# Patient Record
Sex: FEMALE | Race: BLACK OR AFRICAN AMERICAN | NOT HISPANIC OR LATINO | Employment: FULL TIME | URBAN - METROPOLITAN AREA
[De-identification: names, ages, dates, MRNs, and addresses within clinical notes are randomized per-mention and may not be internally consistent; named-entity substitution may affect disease eponyms.]

---

## 2021-09-30 ENCOUNTER — APPOINTMENT (OUTPATIENT)
Dept: URGENT CARE | Facility: MEDICAL CENTER | Age: 51
End: 2021-09-30
Payer: OTHER MISCELLANEOUS

## 2021-09-30 PROCEDURE — 99284 EMERGENCY DEPT VISIT MOD MDM: CPT | Performed by: PHYSICIAN ASSISTANT

## 2021-09-30 PROCEDURE — G0383 LEV 4 HOSP TYPE B ED VISIT: HCPCS | Performed by: PHYSICIAN ASSISTANT

## 2021-10-07 ENCOUNTER — APPOINTMENT (OUTPATIENT)
Dept: URGENT CARE | Facility: MEDICAL CENTER | Age: 51
End: 2021-10-07
Payer: OTHER MISCELLANEOUS

## 2021-10-07 PROCEDURE — 99213 OFFICE O/P EST LOW 20 MIN: CPT | Performed by: PHYSICIAN ASSISTANT

## 2021-10-18 ENCOUNTER — APPOINTMENT (OUTPATIENT)
Dept: URGENT CARE | Age: 51
End: 2021-10-18
Payer: OTHER MISCELLANEOUS

## 2021-10-18 PROCEDURE — 99214 OFFICE O/P EST MOD 30 MIN: CPT | Performed by: PREVENTIVE MEDICINE

## 2021-11-02 ENCOUNTER — APPOINTMENT (OUTPATIENT)
Dept: URGENT CARE | Age: 51
End: 2021-11-02
Payer: OTHER MISCELLANEOUS

## 2021-11-02 PROCEDURE — 99213 OFFICE O/P EST LOW 20 MIN: CPT | Performed by: PREVENTIVE MEDICINE

## 2022-01-10 ENCOUNTER — APPOINTMENT (OUTPATIENT)
Dept: URGENT CARE | Age: 52
End: 2022-01-10
Payer: OTHER MISCELLANEOUS

## 2022-01-10 PROCEDURE — 99213 OFFICE O/P EST LOW 20 MIN: CPT | Performed by: PREVENTIVE MEDICINE

## 2022-01-31 ENCOUNTER — APPOINTMENT (OUTPATIENT)
Dept: URGENT CARE | Age: 52
End: 2022-01-31
Payer: OTHER MISCELLANEOUS

## 2022-01-31 PROCEDURE — 99213 OFFICE O/P EST LOW 20 MIN: CPT | Performed by: PREVENTIVE MEDICINE

## 2022-02-24 ENCOUNTER — APPOINTMENT (OUTPATIENT)
Dept: URGENT CARE | Age: 52
End: 2022-02-24
Payer: OTHER MISCELLANEOUS

## 2022-02-24 PROCEDURE — 99213 OFFICE O/P EST LOW 20 MIN: CPT | Performed by: PREVENTIVE MEDICINE

## 2022-03-02 ENCOUNTER — OFFICE VISIT (OUTPATIENT)
Dept: INTERNAL MEDICINE CLINIC | Facility: CLINIC | Age: 52
End: 2022-03-02
Payer: COMMERCIAL

## 2022-03-02 VITALS
SYSTOLIC BLOOD PRESSURE: 130 MMHG | HEART RATE: 85 BPM | OXYGEN SATURATION: 97 % | TEMPERATURE: 98.7 F | DIASTOLIC BLOOD PRESSURE: 80 MMHG | HEIGHT: 65 IN

## 2022-03-02 DIAGNOSIS — Z12.31 BREAST CANCER SCREENING BY MAMMOGRAM: ICD-10-CM

## 2022-03-02 DIAGNOSIS — S89.91XD KNEE INJURY, RIGHT, SUBSEQUENT ENCOUNTER: ICD-10-CM

## 2022-03-02 DIAGNOSIS — R03.0 PRE-HYPERTENSION: ICD-10-CM

## 2022-03-02 DIAGNOSIS — Z13.1 SCREENING FOR DIABETES MELLITUS: ICD-10-CM

## 2022-03-02 DIAGNOSIS — Z13.89 SCREENING FOR BLOOD OR PROTEIN IN URINE: ICD-10-CM

## 2022-03-02 DIAGNOSIS — Z12.11 ENCOUNTER FOR COLORECTAL CANCER SCREENING: Primary | ICD-10-CM

## 2022-03-02 DIAGNOSIS — Z78.0 MENOPAUSE: ICD-10-CM

## 2022-03-02 DIAGNOSIS — Z13.228 SCREENING FOR METABOLIC DISORDER: ICD-10-CM

## 2022-03-02 DIAGNOSIS — Z12.12 ENCOUNTER FOR COLORECTAL CANCER SCREENING: Primary | ICD-10-CM

## 2022-03-02 DIAGNOSIS — Z13.6 ENCOUNTER FOR LIPID SCREENING FOR CARDIOVASCULAR DISEASE: ICD-10-CM

## 2022-03-02 DIAGNOSIS — Z13.220 ENCOUNTER FOR LIPID SCREENING FOR CARDIOVASCULAR DISEASE: ICD-10-CM

## 2022-03-02 DIAGNOSIS — D50.0 IRON DEFICIENCY ANEMIA DUE TO CHRONIC BLOOD LOSS: ICD-10-CM

## 2022-03-02 DIAGNOSIS — M87.9 OSTEONECROSIS (HCC): ICD-10-CM

## 2022-03-02 DIAGNOSIS — E55.9 VITAMIN D DEFICIENCY: ICD-10-CM

## 2022-03-02 PROCEDURE — 3725F SCREEN DEPRESSION PERFORMED: CPT | Performed by: INTERNAL MEDICINE

## 2022-03-02 PROCEDURE — 99204 OFFICE O/P NEW MOD 45 MIN: CPT | Performed by: INTERNAL MEDICINE

## 2022-03-02 NOTE — PROGRESS NOTES
Assessment/Plan:    I spent 45 minutes directly with the patient during this visit  today in which greater than 50% of this time was spent in counseling/coordination of care regarding Diagnostic results, Prognosis, Risks and benefits of tx options, Intructions for management, Patient and family education, Importance of tx compliance, Risk factor reductions and Impressions  All the HCM needs were addressed  Pt was give screening scripts and advised follow up after completion  Borderline HTN : Recommend Echo for evaluation of LV  Additionally,   Pt would like consideration to not need to do certain maneuvers at work which would need for her to intervene physically due to damage to her right knee due one such incident  She also has hx of DJD in both knees and a previous hx of damage to her right ankle   I do not have the details of her visit with workmen's comp           Diagnoses and all orders for this visit:    Encounter for colorectal cancer screening  -     Mammo screening bilateral w 3d & cad; Future  -     Cologuard    Screening for diabetes mellitus  -     Hemoglobin A1C; Future    Iron deficiency anemia due to chronic blood loss  -     CBC and differential; Future    Breast cancer screening by mammogram  -     Mammo screening bilateral w 3d & cad; Future    Encounter for lipid screening for cardiovascular disease  -     Lipid Panel with Direct LDL reflex; Future  -     UA w Reflex to Microscopic w Reflex to Culture    Screening for blood or protein in urine    Screening for metabolic disorder  -     Comprehensive metabolic panel; Future    Menopause  -     Follicle stimulating hormone; Future    Vitamin D deficiency  -     Vitamin D 25 hydroxy; Future    Osteonecrosis (HCC)  -     Cancel: XR ankle 3+ vw right; Future    Pre-hypertension  -     Echo complete w/ contrast if indicated; Future    Knee injury, right, subsequent encounter  -     Ambulatory Referral to Orthopedic Surgery;  Future Subjective:   Chief Complaint   Patient presents with    New Patient Visit     [address] Screening     dep screen mammogram ordered pt would not like to have colonoscopy done      Patient ID: Carmina Siddiqi is a 45 y o  female  Pt would like consideration to not need to do certain maneuvers at work which would need for her to intervene physically due to damage to her right knee due one such incident  She also has hx of DJD in both knees and a previous hx of damage to her right ankle     Knee Pain   Injury mechanism: Happened about September 2021, at a time when she was being trained for restrain training when her knee slipped out on her resulting in damage to her middle min meniscus  Treated conservatively without intervention  Was treated with physical therapy  The pain is present in the right knee  The quality of the pain is described as aching  The pain is moderate  The pain has been improving since onset  The symptoms are aggravated by movement  Treatments tried: Was treated conservatively with physical therapy and over time has progressively improved  The treatment provided moderate relief  Arthritis  Presents for follow-up visit  She complains of pain, stiffness and joint swelling  Affected locations include the left knee, right knee and right ankle  Pain scale currently: Three years back had osteonecrosis of distal head of fibula for unknown etiology  Treated conservatively  The following portions of the patient's history were reviewed and updated as appropriate: past family history, past medical history, past social history, past surgical history and problem listecho    Review of Systems   Constitutional: Positive for activity change  HENT: Positive for postnasal drip  Musculoskeletal: Positive for arthritis, joint swelling and stiffness  Allergic/Immunologic: Positive for environmental allergies and food allergies           Past Medical History: Diagnosis Date    Anemia     Arthritis        No current outpatient medications on file  Allergies   Allergen Reactions    Peanut (Diagnostic) - Food Allergy Hives, Shortness Of Breath and Throat Swelling    Cat Hair Extract Allergic Rhinitis    Shrimp (Diagnostic) - Food Allergy Swelling       Social History   History reviewed  No pertinent surgical history  Family History   Problem Relation Age of Onset    Diabetes Mother     Hypertension Mother    [facility] Rheum arthritis Mother     No Known Problems Father     Anemia Sister        Objective:  /80 (BP Location: Left arm, Patient Position: Sitting, Cuff Size: Large)   Pulse 85   Temp 98 7 °F (37 1 °C) (Tympanic)   Ht 5' 4 96" (1 65 m)   SpO2 97% Comment: room air    No results found for this or any previous visit (from the past 1344 hour(s))  Physical Exam      Gen: NAD  Heent: atraumatic, normocephalic  Mouth: is moist  Neck: is supple, no JVD, no carotid bruits     Heart: is regular Normal s1, s2,  Lungs: are clear to auscultation  Abd: soft, non tender, NABS  Ext: no edema, distal pulses normal  Skin: no rashes, ulcers  Mood: normal affect  Neuro: AAOx3

## 2022-03-24 ENCOUNTER — OFFICE VISIT (OUTPATIENT)
Dept: OBGYN CLINIC | Facility: MEDICAL CENTER | Age: 52
End: 2022-03-24
Payer: COMMERCIAL

## 2022-03-24 ENCOUNTER — APPOINTMENT (OUTPATIENT)
Dept: RADIOLOGY | Facility: MEDICAL CENTER | Age: 52
End: 2022-03-24
Payer: COMMERCIAL

## 2022-03-24 VITALS — HEIGHT: 65 IN

## 2022-03-24 DIAGNOSIS — S89.91XD KNEE INJURY, RIGHT, SUBSEQUENT ENCOUNTER: ICD-10-CM

## 2022-03-24 DIAGNOSIS — M25.571 PAIN, JOINT, ANKLE AND FOOT, RIGHT: ICD-10-CM

## 2022-03-24 DIAGNOSIS — M25.561 CHRONIC PAIN OF RIGHT KNEE: ICD-10-CM

## 2022-03-24 DIAGNOSIS — G89.29 CHRONIC PAIN OF RIGHT KNEE: ICD-10-CM

## 2022-03-24 DIAGNOSIS — M95.8 OSTEOCHONDRAL DEFECT OF ANKLE: ICD-10-CM

## 2022-03-24 DIAGNOSIS — M17.11 PRIMARY OSTEOARTHRITIS OF RIGHT KNEE: Primary | ICD-10-CM

## 2022-03-24 PROCEDURE — 1036F TOBACCO NON-USER: CPT | Performed by: PHYSICAL MEDICINE & REHABILITATION

## 2022-03-24 PROCEDURE — 73610 X-RAY EXAM OF ANKLE: CPT

## 2022-03-24 PROCEDURE — 20610 DRAIN/INJ JOINT/BURSA W/O US: CPT | Performed by: PHYSICAL MEDICINE & REHABILITATION

## 2022-03-24 PROCEDURE — 99204 OFFICE O/P NEW MOD 45 MIN: CPT | Performed by: PHYSICAL MEDICINE & REHABILITATION

## 2022-03-24 RX ORDER — TRIAMCINOLONE ACETONIDE 40 MG/ML
40 INJECTION, SUSPENSION INTRA-ARTICULAR; INTRAMUSCULAR
Status: COMPLETED | OUTPATIENT
Start: 2022-03-24 | End: 2022-03-24

## 2022-03-24 RX ORDER — LIDOCAINE HYDROCHLORIDE 10 MG/ML
3 INJECTION, SOLUTION INFILTRATION; PERINEURAL
Status: COMPLETED | OUTPATIENT
Start: 2022-03-24 | End: 2022-03-24

## 2022-03-24 RX ADMIN — TRIAMCINOLONE ACETONIDE 40 MG: 40 INJECTION, SUSPENSION INTRA-ARTICULAR; INTRAMUSCULAR at 13:33

## 2022-03-24 RX ADMIN — LIDOCAINE HYDROCHLORIDE 3 ML: 10 INJECTION, SOLUTION INFILTRATION; PERINEURAL at 13:33

## 2022-03-24 NOTE — PATIENT INSTRUCTIONS
You had a cortisone injection today  Some people also refer to this as steroid or corticosteroid  There are two medicines in this injection, a numbing medicine (anesthetic) and a steroid  Most people get relief immediately but it can take up to two weeks  Rarely, some people can get a flushing reaction where they feel warm and flushed in the face  This is a side effect and resolves on its own  If you experience any drainage, redness or fevers, please give us a call or go to the nearest emergency room  You can obtain diclofenac cream/gel/ointment over the counter  Many brands make this medication and they include Voltaren, Aspercreme and Aleve  You can use this up to 3 times per day  It is best to wash the area with water and then pat dry  The cream absorbs better after this  Be careful not to let any pets or children get in contact with the medication as it can be harmful to them  If you develop a skin reaction, stop using the cream     Rules for limiting activity by pain symptoms:      -You can work through some pain, but keep it at a level from which you are distractible  Pain should not exceed 3/10 in severity    -Do not change your biomechanics / form with your activity  This can lead to further injury    -If you pay for your activity later with substantial symptom exacerbation, you are not yet ready for that level of exertion

## 2022-03-24 NOTE — PROGRESS NOTES
1  Primary osteoarthritis of right knee     2  Chronic pain of right knee  XR knee 3 vw right non injury    Large joint arthrocentesis: R knee   3  Knee injury, right, subsequent encounter  Ambulatory Referral to Orthopedic Surgery   4  Pain, joint, ankle and foot, right  XR ankle 3+ vw right   5  Osteochondral defect of ankle       Orders Placed This Encounter   Procedures    Large joint arthrocentesis: R knee    XR knee 3 vw right non injury    XR ankle 3+ vw right      Impression:  Right knee pain likely secondary to osteoarthritis  We discussed different treatment options and decided to proceed with an intra-articular steroid injection  The patient will continue with her home exercise program   I provided her with work restrictions of no restraining while at work  Right ankle pain likely 2/2 to osteoarthritis and likely osteochondral defect  We will characterize this osteochondral defect and obtain an MRI of her ankle  I will see her back for MRI review  Imaging Studies (I personally reviewed images in PACS and report):  Right knee MRI from outside facility reviewed  MCL grade 2 sprain, patellar chondromalacia and degenerative medial meniscus  Right ankle x-rays obtained on 3/24/2022  These images show likely osteochondral defect at the medial talar dome  There are chronic appearing ossicles at the medial malleolus  There are enthesopathic changes in the calcaneus  No follow-ups on file  Patient is in agreement with the above plan  HPI:  Derek Servin is a 45 y o  female  who presents for evaluation of   Chief Complaint   Patient presents with    Right Knee - Pain    Right Ankle - Pain     Onset/Mechanism: September 2021- 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  Worker's compensation recommended her to come back  Location: She is having pain on the inside of the knee  She also has chronic ankle pain in the front of her ankle    Radiation: As above   Provocative: Steps  Severity: 2/10 in severity  Associated Symptoms: Also reports ankle pain  Treatment so far: Physical therapy  Patient was told by worker's compensation that she needs to see orthopedics to have her work restrictions continued  Following history reviewed and updated:  Past Medical History:   Diagnosis Date    Anemia     Arthritis      History reviewed  No pertinent surgical history  Social History   Social History     Substance and Sexual Activity   Alcohol Use Not Currently     Social History     Substance and Sexual Activity   Drug Use Never     Social History     Tobacco Use   Smoking Status Never Smoker   Smokeless Tobacco Never Used     Family History   Problem Relation Age of Onset    Diabetes Mother     Hypertension Mother     Rheum arthritis Mother     No Known Problems Father     Anemia Sister      No Known Allergies     Constitutional:  Ht 5' 4 96" (1 65 m)    General: NAD  Eyes: Anicteric sclerae  Neck: Supple  Lungs: Unlabored breathing  Cardiovascular: No lower extremity edema  Skin: Intact without erythema  Neurologic: Sensation intact to light touch  Psychiatric: Mood and affect are appropriate  Right Ankle Exam   Swelling: none    Range of Motion   The patient has normal right ankle ROM  Other   Erythema: absent  Scars: absent  Sensation: normal  Pulse: present       Right Knee Exam     Tenderness   The patient is experiencing tenderness in the medial joint line      Range of Motion   Extension: normal   Flexion: abnormal     Tests   Varus: negative Valgus: negative    Other   Erythema: absent  Scars: absent  Sensation: normal  Pulse: present  Swelling: none  Effusion: no effusion present    Comments:  Positive bounce home test              Large joint arthrocentesis: R knee  Universal Protocol:  Consent given by: patient  Timeout called at: 3/24/2022 1:33 PM   Site marked: the operative site was marked  Supporting Documentation  Indications: pain   Procedure Details  Location: knee - R knee  Needle gauge: 21G 2''  Ultrasound guidance: no  Approach: Inferolateral to patella  Medications administered: 40 mg triamcinolone acetonide 40 mg/mL; 3 mL lidocaine 1 %    Patient tolerance: patient tolerated the procedure well with no immediate complications  Dressing:  Sterile dressing applied    A 2 inch needle was used and I was able to hub the entire needle  This makes it fairly certain that I am within the intercondylar notch/joint space  There was little to no resistance encountered during the injection  Prior to the procedure, the patient was informed of the following risks in layman terms:    - Risk of bleeding since a needle is involved  - Risk of infection (1/10,000 chance as per recent studies)  Signs/symptoms were discussed and they would prompt an urgent evaluation at an emergency department   - Risk of pigmentation or skin dimpling in the skin (2-3% chance as per recent studies) from the steroid  - Risk of increased pain from steroid flare (1% chance as per recent studies) that typically lasts 24-48 hours  - Risk of increased blood sugars from the steroid medication that can last for a few weeks  If the patient is a diabetic or pre-diabetic, they were encouraged to closely monitor their blood sugars and discuss with PCP if elevated more than usual or if having symptoms  After going over these risks, we decided that the benefits outweigh the risks and proceeded with the procedure

## 2022-03-24 NOTE — LETTER
To Whom It May Concern,    Vamsi Mason is under my professional care  She was seen in my office on March 24, 2022  She is cleared to return to work with the following restrictions:     No restraining  Please excuse Vamsi Mason from any work missed on this appointment date  If you have any questions or concerns, please do not hesitate to call          Sincerely,          Ronald Perez, DO

## 2022-04-19 ENCOUNTER — HOSPITAL ENCOUNTER (OUTPATIENT)
Dept: RADIOLOGY | Age: 52
Discharge: HOME | End: 2022-04-19
Payer: COMMERCIAL

## 2022-04-19 DIAGNOSIS — M25.571 PAIN, JOINT, ANKLE AND FOOT, RIGHT: ICD-10-CM

## 2022-04-19 DIAGNOSIS — M95.8 OSTEOCHONDRAL DEFECT OF ANKLE: ICD-10-CM

## 2022-04-19 PROCEDURE — 73721 MRI JNT OF LWR EXTRE W/O DYE: CPT

## 2022-04-19 PROCEDURE — G1004 CDSM NDSC: HCPCS

## 2022-04-21 ENCOUNTER — TELEPHONE (OUTPATIENT)
Dept: OBGYN CLINIC | Facility: MEDICAL CENTER | Age: 52
End: 2022-04-21

## 2022-04-21 NOTE — TELEPHONE ENCOUNTER
Patient sees Dr Shay Garcia  Patient calling to cancel her appt due to an emergency meeting with her employer today    Rescheduled for 05/4/2022 @ 1:15 pm

## 2022-05-04 ENCOUNTER — OFFICE VISIT (OUTPATIENT)
Dept: OBGYN CLINIC | Facility: MEDICAL CENTER | Age: 52
End: 2022-05-04
Payer: COMMERCIAL

## 2022-05-04 VITALS — HEIGHT: 63 IN | SYSTOLIC BLOOD PRESSURE: 136 MMHG | DIASTOLIC BLOOD PRESSURE: 84 MMHG | HEART RATE: 78 BPM

## 2022-05-04 DIAGNOSIS — M17.11 PRIMARY OSTEOARTHRITIS OF RIGHT KNEE: ICD-10-CM

## 2022-05-04 DIAGNOSIS — M95.8 OSTEOCHONDRAL DEFECT OF ANKLE: Primary | ICD-10-CM

## 2022-05-04 PROCEDURE — 99214 OFFICE O/P EST MOD 30 MIN: CPT | Performed by: PHYSICAL MEDICINE & REHABILITATION

## 2022-05-04 NOTE — PROGRESS NOTES
1  Osteochondral defect of ankle  Ambulatory referral to Orthopedic Surgery   2  Primary osteoarthritis of right knee       Orders Placed This Encounter   Procedures    Ambulatory referral to Orthopedic Surgery        Impression:  Patient is here in follow up of right knee pain likely secondary to osteoarthritis  Patient had an intra-articular steroid injection on her initial visit  She reports no pain today  The patient will continue with her home exercise program       Right ankle pain likely 2/2 to osteoarthritis and osteochondral defect  We reviewed her MRI today  Although, the patient's symptoms have improved, she does have intermittent pain and articular collapse  In light of this, we will have her go for a consultation with ankle and foot surgeon, Dr Gabriel Hermosillo  Continue work restrictions of no restraining due to knee injury      Imaging Studies (I personally reviewed images in PACS and report):  Right knee MRI from outside facility reviewed  MCL grade 2 sprain, patellar chondromalacia and degenerative medial meniscus      Right ankle x-rays obtained on 3/24/2022  These images show likely osteochondral defect at the medial talar dome  There are chronic appearing ossicles at the medial malleolus  There are enthesopathic changes in the calcaneus  Right ankle MRI reviewed  There is an osteochondral defect of the medial talar dome which appears to be chronic with cystic changes  No follow-ups on file  Patient is in agreement with the above plan  HPI:  Michelle Ball is a 45 y o  female  who presents in follow up  Here for   Chief Complaint   Patient presents with    Right Ankle - Follow-up     September 2021- 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  Worker's compensation recommended her to come back  Since last visit:  Her knee feels great  She has intermittent ankle pain but this is also improved with diet changes      Following history reviewed and updated:  Past Medical History:   Diagnosis Date    Anemia     Arthritis      History reviewed  No pertinent surgical history  Social History   Social History     Substance and Sexual Activity   Alcohol Use Not Currently     Social History     Substance and Sexual Activity   Drug Use Never     Social History     Tobacco Use   Smoking Status Never Smoker   Smokeless Tobacco Never Used     Family History   Problem Relation Age of Onset    Diabetes Mother     Hypertension Mother     Rheum arthritis Mother     No Known Problems Father     Anemia Sister      Allergies   Allergen Reactions    Peanut (Diagnostic) - Food Allergy Hives, Shortness Of Breath and Throat Swelling    Cat Hair Extract Allergic Rhinitis    Shrimp (Diagnostic) - Food Allergy Swelling        Constitutional:  /84   Pulse 78   Ht 5' 3" (1 6 m)    General: NAD  Eyes: Clear sclerae  ENT: No inflammation, lesion, or mass of lips  No tracheal deviation  Musculoskeletal: As mentioned below  Integumentary: No visible rashes or skin lesions  Pulmonary/Chest: Effort normal  No respiratory distress  Neuro: CN's grossly intact, SHULTZ  Psych: Normal affect and judgement  Vascular: WWP  Right Ankle Exam   Swelling: mild    Range of Motion   The patient has normal right ankle ROM  Tests   Anterior drawer: negative    Other   Erythema: absent  Scars: absent  Sensation: normal  Pulse: present       Right Knee Exam     Tenderness   The patient is experiencing no tenderness  Range of Motion   The patient has normal right knee ROM      Tests   Varus: negative Valgus: negative    Other   Erythema: absent  Scars: absent  Sensation: normal  Pulse: present  Swelling: none  Effusion: no effusion present             Procedures

## 2022-05-26 ENCOUNTER — RA CDI HCC (OUTPATIENT)
Dept: OTHER | Facility: HOSPITAL | Age: 52
End: 2022-05-26

## 2022-05-26 NOTE — PROGRESS NOTES
[facility]  coding opportunities       Chart reviewed, no opportunity found: CHART REVIEWED, NO OPPORTUNITY FOUND        Patients Insurance        Commercial Insurance: [address] [place] [address]

## 2022-06-07 PROBLEM — R03.0 PREHYPERTENSION: Status: ACTIVE | Noted: 2022-06-07

## 2022-07-12 ENCOUNTER — HOSPITAL ENCOUNTER (OUTPATIENT)
Dept: MAMMOGRAPHY | Facility: HOSPITAL | Age: 52
Discharge: HOME | End: 2022-07-12
Payer: COMMERCIAL

## 2022-07-12 VITALS — HEIGHT: 63 IN | BODY MASS INDEX: 48.4 KG/M2 | WEIGHT: 273.15 LBS

## 2022-07-12 DIAGNOSIS — Z12.31 BREAST CANCER SCREENING BY MAMMOGRAM: ICD-10-CM

## 2022-07-12 DIAGNOSIS — Z12.11 ENCOUNTER FOR COLORECTAL CANCER SCREENING: ICD-10-CM

## 2022-07-12 DIAGNOSIS — Z12.12 ENCOUNTER FOR COLORECTAL CANCER SCREENING: ICD-10-CM

## 2022-07-12 PROCEDURE — 77067 SCR MAMMO BI INCL CAD: CPT

## 2022-07-12 PROCEDURE — 77063 BREAST TOMOSYNTHESIS BI: CPT

## 2022-10-11 PROBLEM — Z12.11 ENCOUNTER FOR COLORECTAL CANCER SCREENING: Status: RESOLVED | Noted: 2022-03-02 | Resolved: 2022-10-11

## 2022-10-11 PROBLEM — Z12.12 ENCOUNTER FOR COLORECTAL CANCER SCREENING: Status: RESOLVED | Noted: 2022-03-02 | Resolved: 2022-10-11

## 2023-11-16 ENCOUNTER — OFFICE VISIT (OUTPATIENT)
Dept: OBGYN CLINIC | Facility: CLINIC | Age: 53
End: 2023-11-16
Payer: COMMERCIAL

## 2023-11-16 VITALS — SYSTOLIC BLOOD PRESSURE: 130 MMHG | DIASTOLIC BLOOD PRESSURE: 90 MMHG

## 2023-11-16 DIAGNOSIS — R39.9 UTI SYMPTOMS: Primary | ICD-10-CM

## 2023-11-16 LAB
SL AMB  POCT GLUCOSE, UA: ABNORMAL
SL AMB LEUKOCYTE ESTERASE,UA: ABNORMAL
SL AMB POCT BILIRUBIN,UA: ABNORMAL
SL AMB POCT BLOOD,UA: ABNORMAL
SL AMB POCT CLARITY,UA: ABNORMAL
SL AMB POCT COLOR,UA: ABNORMAL
SL AMB POCT KETONES,UA: ABNORMAL
SL AMB POCT NITRITE,UA: ABNORMAL
SL AMB POCT PH,UA: 6
SL AMB POCT SPECIFIC GRAVITY,UA: 1.02
SL AMB POCT URINE PROTEIN: ABNORMAL
SL AMB POCT UROBILINOGEN: ABNORMAL

## 2023-11-16 PROCEDURE — 81002 URINALYSIS NONAUTO W/O SCOPE: CPT | Performed by: OBSTETRICS & GYNECOLOGY

## 2023-11-16 PROCEDURE — 87086 URINE CULTURE/COLONY COUNT: CPT | Performed by: OBSTETRICS & GYNECOLOGY

## 2023-11-16 PROCEDURE — 99203 OFFICE O/P NEW LOW 30 MIN: CPT | Performed by: OBSTETRICS & GYNECOLOGY

## 2023-11-16 RX ORDER — BUPROPION HYDROCHLORIDE 300 MG/1
TABLET ORAL
COMMUNITY
Start: 2023-09-14

## 2023-11-16 RX ORDER — CLONIDINE HYDROCHLORIDE 0.1 MG/1
TABLET ORAL
COMMUNITY
Start: 2023-11-15

## 2023-11-16 RX ORDER — ERGOCALCIFEROL 1.25 MG/1
CAPSULE ORAL
COMMUNITY
Start: 2023-11-15

## 2023-11-16 NOTE — PROGRESS NOTES
Assessment/Plan:      Diagnoses and all orders for this visit:    UTI symptoms  -     Urine culture  -     POCT urine dip    Other orders  -     buPROPion (WELLBUTRIN XL) 300 mg 24 hr tablet; TAKE 1 EXTENDED RELEASE TABLET BY MOUTH IN THE MORNING FOR 30 DAYS. -     cloNIDine (CATAPRES) 0.1 mg tablet;  (Patient not taking: Reported on 11/16/2023)  -     ergocalciferol (VITAMIN D2) 50,000 units  -     metFORMIN (GLUCOPHAGE) 500 mg tablet; Take 500 mg by mouth 2 (two) times a day (Patient not taking: Reported on 11/16/2023)      Will call with urine culture results. Encouraged dietary changes and new soap. Subjective:     Patient ID: Justin Mandujano is a 52 y.o. female. Patient presents as new patient. She has been having a strong urge to urinate over the last couple of weeks. She was on metformin for weight loss and clonidine but noticed weight gain on these so stopped them. She has been trying to eat more fruit and has been doing more citrus fruit. She has also been having more coffee. She has also noticed some external irritation when urinating. She recently changed her soap as she felt that might be irritating her. She denies vaginal discharge, bleeding, specific lesions.           Review of Systems      Objective:     Physical Exam    Normal external genitalia without lesions; no significant vaginal discharge

## 2023-11-18 LAB — BACTERIA UR CULT: NORMAL

## 2024-01-30 ENCOUNTER — OFFICE VISIT (OUTPATIENT)
Age: 54
End: 2024-01-30
Payer: COMMERCIAL

## 2024-01-30 VITALS
DIASTOLIC BLOOD PRESSURE: 90 MMHG | BODY MASS INDEX: 44.96 KG/M2 | TEMPERATURE: 97.7 F | HEIGHT: 65 IN | HEART RATE: 74 BPM | OXYGEN SATURATION: 98 % | SYSTOLIC BLOOD PRESSURE: 144 MMHG

## 2024-01-30 DIAGNOSIS — G89.29 CHRONIC PAIN OF BOTH KNEES: ICD-10-CM

## 2024-01-30 DIAGNOSIS — Z12.12 ENCOUNTER FOR COLORECTAL CANCER SCREENING: ICD-10-CM

## 2024-01-30 DIAGNOSIS — E66.01 MORBID EXOGENOUS OBESITY (HCC): ICD-10-CM

## 2024-01-30 DIAGNOSIS — M25.562 CHRONIC PAIN OF BOTH KNEES: ICD-10-CM

## 2024-01-30 DIAGNOSIS — Z12.11 ENCOUNTER FOR COLORECTAL CANCER SCREENING: ICD-10-CM

## 2024-01-30 DIAGNOSIS — Z00.01 ANNUAL VISIT FOR GENERAL ADULT MEDICAL EXAMINATION WITH ABNORMAL FINDINGS: Primary | ICD-10-CM

## 2024-01-30 DIAGNOSIS — E55.9 VITAMIN D DEFICIENCY: ICD-10-CM

## 2024-01-30 DIAGNOSIS — Z23 ENCOUNTER FOR IMMUNIZATION: ICD-10-CM

## 2024-01-30 DIAGNOSIS — I10 PRIMARY HYPERTENSION: ICD-10-CM

## 2024-01-30 DIAGNOSIS — M25.561 CHRONIC PAIN OF BOTH KNEES: ICD-10-CM

## 2024-01-30 DIAGNOSIS — M95.8 OSTEOCHONDRAL DEFECT OF ANKLE: ICD-10-CM

## 2024-01-30 DIAGNOSIS — R73.9 HYPERGLYCEMIA: ICD-10-CM

## 2024-01-30 PROCEDURE — 90686 IIV4 VACC NO PRSV 0.5 ML IM: CPT

## 2024-01-30 PROCEDURE — 99396 PREV VISIT EST AGE 40-64: CPT | Performed by: INTERNAL MEDICINE

## 2024-01-30 PROCEDURE — 99214 OFFICE O/P EST MOD 30 MIN: CPT | Performed by: INTERNAL MEDICINE

## 2024-01-30 PROCEDURE — 90471 IMMUNIZATION ADMIN: CPT

## 2024-01-30 RX ORDER — ERGOCALCIFEROL 1.25 MG/1
50000 CAPSULE ORAL WEEKLY
Qty: 12 CAPSULE | Refills: 1 | Status: SHIPPED | OUTPATIENT
Start: 2024-01-30

## 2024-01-30 RX ORDER — LOSARTAN POTASSIUM AND HYDROCHLOROTHIAZIDE 12.5; 5 MG/1; MG/1
1 TABLET ORAL DAILY
Qty: 90 TABLET | Refills: 1 | Status: SHIPPED | OUTPATIENT
Start: 2024-01-30

## 2024-01-30 NOTE — ASSESSMENT & PLAN NOTE
Will check HBA1C to see if patient would need to start metformin.  Other options would also be beneficial especially for weight loss.  He is interested in Ozempic /semaglutide if she would qualify for it

## 2024-01-30 NOTE — PROGRESS NOTES
Name: Elizabeth Fuentes      : 1970      MRN: [id number]  Encounter Provider: Zofia Maloney MD  Encounter Date: 2024   Encounter department: [facility] [place]    Assessment & Plan     1. Annual visit for general adult medical examination with abnormal findings  Assessment & Plan:    She needs to follow through with cervical, colorectal and breast cancer screening as well    I have spent 45 minutes with Patient  today in which greater than 50% of this time was spent in detail history and examination the patient and coordination of care regarding Diagnostic results, Prognosis, Risks and benefits of tx options, Intructions for management, Patient and family education, Importance of tx compliance, Risk factor reductions and Impressions.       2. Primary hypertension  -     CBC and differential; Future  -     Comprehensive metabolic panel; Future  -     Hemoglobin A1C; Future  -     Lipid Panel with Direct LDL reflex; Future  -     TSH, 3rd generation with Free T4 reflex; Future  -     UA w Reflex to Microscopic w Reflex to Culture; Future  -     losartan-hydrochlorothiazide (HYZAAR) 50-12.5 mg per tablet; Take 1 tablet by mouth daily    3. Hyperglycemia  Assessment & Plan:  Will check HBA1C to see if patient would need to start metformin.  Other options would also be beneficial especially for weight loss.  He is interested in Ozempic /semaglutide if she would qualify for it      4. Encounter for colorectal cancer screening  -     Ambulatory Referral to Gastroenterology; Future    5. Vitamin D deficiency  -     ergocalciferol (VITAMIN D2) 50,000 units; Take 1 capsule (50,000 Units total) by mouth once a week    6. Encounter for immunization  -     influenza vaccine, quadrivalent, 0.5 mL, preservative-free, for adult and pediatric patients 6 mos+ (AFLURIA, FLUARIX, FLULAVAL, FLUZONE)    7. Osteochondral defect of ankle  -     Vitamin D 25 hydroxy; Future    8. Morbid  exogenous obesity (HCC)  -     Ambulatory Referral to Weight Management; Future    9. Chronic pain of both knees  Assessment & Plan:  Suspect fairly advanced DJD.  Recommend further workup with x-rays and possible referral to orthopedic surgery             Subjective     Chief Complaint   Patient presents with   • Physical Exam     Annual physical blood pressure has been running high   • Health Screening     Depression screen mammogram patient would not like to have done colonoscopy ordered            HPI    Here for a physical exam.  Works as a supervisor of special education.  Involves travel to different schools to ensure safety of the kids that are special needs, autism and also the wellbeing of their teachers.  Overall she feels well aside from the blood pressure which is high.  She feels the pressure in her head forehead and her nose and also nervous.  She previously saw a nurse practitioner who prescribed the Wellbutrin.  C/O knee pain, left greater than right    Review of Systems   Constitutional:  Positive for activity change.   Cardiovascular:  Positive for leg swelling.   Musculoskeletal:  Positive for arthralgias, gait problem and joint swelling.        Knee pain   Neurological:  Positive for headaches.       Past Medical History:   Diagnosis Date   • Anemia    • Arthritis      Past Surgical History:   Procedure Laterality Date   • NO PAST SURGERIES       Family History   Problem Relation Age of Onset   • Diabetes Mother    • Hypertension Mother    • Rheum arthritis Mother    • Alcohol abuse Father    • Diabetes Father    • Anemia Sister    • Anxiety disorder Sister      Social History     Socioeconomic History   • Marital status: Single     Spouse name: None   • Number of children: None   • Years of education: None   • Highest education level: None   Occupational History   • None   Tobacco Use   • Smoking status: Never   • Smokeless tobacco: Never   Vaping Use   • Vaping status: Never Used   Substance  "and Sexual Activity   • Alcohol use: Yes     Alcohol/week: 1.0 standard drink of alcohol     Types: 1 Glasses of wine per week     Comment: Every 3 weeks   • Drug use: Never   • Sexual activity: Yes     Birth control/protection: Post-menopausal   Other Topics Concern   • None   Social History Narrative   • None     Social Determinants of Health     Financial Resource Strain: Not on file   Food Insecurity: Not on file   Transportation Needs: Not on file   Physical Activity: Not on file   Stress: Not on file   Social Connections: Not on file   Intimate Partner Violence: Not on file   Housing Stability: Not on file     Current Outpatient Medications on File Prior to Visit   Medication Sig   • buPROPion (WELLBUTRIN XL) 300 mg 24 hr tablet TAKE 1 EXTENDED RELEASE TABLET BY MOUTH IN THE MORNING FOR 30 DAYS.   • [DISCONTINUED] ergocalciferol (VITAMIN D2) 50,000 units    • [DISCONTINUED] cloNIDine (CATAPRES) 0.1 mg tablet  (Patient not taking: Reported on 11/16/2023)   • [DISCONTINUED] metFORMIN (GLUCOPHAGE) 500 mg tablet Take 500 mg by mouth 2 (two) times a day (Patient not taking: Reported on 11/16/2023)     Allergies   Allergen Reactions   • Peanut (Diagnostic) - Food Allergy Hives, Shortness Of Breath and Throat Swelling   • Cat Hair Extract Allergic Rhinitis   • Shrimp (Diagnostic) - Food Allergy Swelling     Immunization History   Administered Date(s) Administered   • COVID-19 PFIZER VACCINE 0.3 ML IM 12/16/2021, 12/16/2021   • Influenza, injectable, quadrivalent, preservative free 0.5 mL 01/30/2024       Objective     /90 (BP Location: Right arm, Patient Position: Sitting, Cuff Size: Large)   Pulse 74   Temp 97.7 °F (36.5 °C) (Temporal)   Ht 5' 5.35\" (1.66 m)   LMP  (LMP Unknown)   SpO2 98%   BMI 44.96 kg/m²     Physical Exam    Physical Exam   Constitutional: She is oriented to person, place, and time. She appears well-developed and well-nourished. No distress.   HENT:   Head: Normocephalic and " atraumatic.   Neck: Normal range of motion. Neck supple.   Pulmonary/Chest: Effort normal. No respiratory distress.   Musculoskeletal: Normal range of motion.   Neurological: She is alert and oriented to person, place, and time.   Skin: She is not diaphoretic.   Psychiatric: She has a normal mood and affect. Her behavior is normal. Judgment and thought content normal.      I spent 45 minutes directly with the patient during this visit  today in which greater than 50% of this time was spent in counseling/coordination of care regarding Diagnostic results, Prognosis, Risks and benefits of tx options, Intructions for management, Patient and family education, Importance of tx compliance, Risk factor reductions and Impressions.

## 2024-01-30 NOTE — ASSESSMENT & PLAN NOTE
She needs to follow through with cervical, colorectal and breast cancer screening as well    I have spent 45 minutes with Patient  today in which greater than 50% of this time was spent in detail history and examination the patient and coordination of care regarding Diagnostic results, Prognosis, Risks and benefits of tx options, Intructions for management, Patient and family education, Importance of tx compliance, Risk factor reductions and Impressions.

## 2024-01-30 NOTE — ASSESSMENT & PLAN NOTE
Suspect fairly advanced DJD.  Recommend further workup with x-rays and possible referral to orthopedic surgery

## 2024-01-30 NOTE — ASSESSMENT & PLAN NOTE
.  On a small dose of losartan/hydrochlorothiazide due to presence of trace edema and elevated blood pressure  Instructed to get blood work done before she initiates medication.  We did discuss the need for her to keep significant weight loss.  In addition to diet she could probably benefit from injectable therapy

## 2024-02-06 ENCOUNTER — APPOINTMENT (OUTPATIENT)
Dept: LAB | Facility: MEDICAL CENTER | Age: 54
End: 2024-02-06
Payer: COMMERCIAL

## 2024-02-06 DIAGNOSIS — M95.8 OSTEOCHONDRAL DEFECT OF ANKLE: ICD-10-CM

## 2024-02-06 DIAGNOSIS — I10 PRIMARY HYPERTENSION: ICD-10-CM

## 2024-02-06 LAB
25(OH)D3 SERPL-MCNC: 20.7 NG/ML (ref 30–100)
ALBUMIN SERPL BCP-MCNC: 4 G/DL (ref 3.5–5)
ALP SERPL-CCNC: 74 U/L (ref 34–104)
ALT SERPL W P-5'-P-CCNC: 16 U/L (ref 7–52)
ANION GAP SERPL CALCULATED.3IONS-SCNC: 8 MMOL/L
AST SERPL W P-5'-P-CCNC: 18 U/L (ref 13–39)
BASOPHILS # BLD AUTO: 0.05 THOUSANDS/ÂΜL (ref 0–0.1)
BASOPHILS NFR BLD AUTO: 1 % (ref 0–1)
BILIRUB SERPL-MCNC: 0.34 MG/DL (ref 0.2–1)
BILIRUB UR QL STRIP: NEGATIVE
BUN SERPL-MCNC: 12 MG/DL (ref 5–25)
CALCIUM SERPL-MCNC: 9 MG/DL (ref 8.4–10.2)
CHLORIDE SERPL-SCNC: 99 MMOL/L (ref 96–108)
CHOLEST SERPL-MCNC: 203 MG/DL
CLARITY UR: CLEAR
CO2 SERPL-SCNC: 28 MMOL/L (ref 21–32)
COLOR UR: NORMAL
CREAT SERPL-MCNC: 1.14 MG/DL (ref 0.6–1.3)
EOSINOPHIL # BLD AUTO: 0.12 THOUSAND/ÂΜL (ref 0–0.61)
EOSINOPHIL NFR BLD AUTO: 2 % (ref 0–6)
ERYTHROCYTE [DISTWIDTH] IN BLOOD BY AUTOMATED COUNT: 15.5 % (ref 11.6–15.1)
EST. AVERAGE GLUCOSE BLD GHB EST-MCNC: 148 MG/DL
GFR SERPL CREATININE-BSD FRML MDRD: 53 ML/MIN/1.73SQ M
GLUCOSE P FAST SERPL-MCNC: 104 MG/DL (ref 65–99)
GLUCOSE UR STRIP-MCNC: NEGATIVE MG/DL
HBA1C MFR BLD: 6.8 %
HCT VFR BLD AUTO: 43.6 % (ref 34.8–46.1)
HDLC SERPL-MCNC: 53 MG/DL
HGB BLD-MCNC: 13.5 G/DL (ref 11.5–15.4)
HGB UR QL STRIP.AUTO: NEGATIVE
IMM GRANULOCYTES # BLD AUTO: 0.02 THOUSAND/UL (ref 0–0.2)
IMM GRANULOCYTES NFR BLD AUTO: 0 % (ref 0–2)
KETONES UR STRIP-MCNC: NEGATIVE MG/DL
LDLC SERPL CALC-MCNC: 123 MG/DL (ref 0–100)
LEUKOCYTE ESTERASE UR QL STRIP: NEGATIVE
LYMPHOCYTES # BLD AUTO: 2.04 THOUSANDS/ÂΜL (ref 0.6–4.47)
LYMPHOCYTES NFR BLD AUTO: 33 % (ref 14–44)
MCH RBC QN AUTO: 25 PG (ref 26.8–34.3)
MCHC RBC AUTO-ENTMCNC: 31 G/DL (ref 31.4–37.4)
MCV RBC AUTO: 81 FL (ref 82–98)
MONOCYTES # BLD AUTO: 0.88 THOUSAND/ÂΜL (ref 0.17–1.22)
MONOCYTES NFR BLD AUTO: 14 % (ref 4–12)
NEUTROPHILS # BLD AUTO: 3.1 THOUSANDS/ÂΜL (ref 1.85–7.62)
NEUTS SEG NFR BLD AUTO: 50 % (ref 43–75)
NITRITE UR QL STRIP: NEGATIVE
NRBC BLD AUTO-RTO: 0 /100 WBCS
PH UR STRIP.AUTO: 6.5 [PH]
PLATELET # BLD AUTO: 247 THOUSANDS/UL (ref 149–390)
PMV BLD AUTO: 10.2 FL (ref 8.9–12.7)
POTASSIUM SERPL-SCNC: 4.3 MMOL/L (ref 3.5–5.3)
PROT SERPL-MCNC: 7.7 G/DL (ref 6.4–8.4)
PROT UR STRIP-MCNC: NEGATIVE MG/DL
RBC # BLD AUTO: 5.4 MILLION/UL (ref 3.81–5.12)
SODIUM SERPL-SCNC: 135 MMOL/L (ref 135–147)
SP GR UR STRIP.AUTO: 1.01 (ref 1–1.03)
TRIGL SERPL-MCNC: 134 MG/DL
TSH SERPL DL<=0.05 MIU/L-ACNC: 1.25 UIU/ML (ref 0.45–4.5)
UROBILINOGEN UR STRIP-ACNC: <2 MG/DL
WBC # BLD AUTO: 6.21 THOUSAND/UL (ref 4.31–10.16)

## 2024-02-06 PROCEDURE — 85025 COMPLETE CBC W/AUTO DIFF WBC: CPT

## 2024-02-06 PROCEDURE — 83036 HEMOGLOBIN GLYCOSYLATED A1C: CPT

## 2024-02-06 PROCEDURE — 82306 VITAMIN D 25 HYDROXY: CPT

## 2024-02-06 PROCEDURE — 80061 LIPID PANEL: CPT

## 2024-02-06 PROCEDURE — 3044F HG A1C LEVEL LT 7.0%: CPT | Performed by: INTERNAL MEDICINE

## 2024-02-06 PROCEDURE — 80053 COMPREHEN METABOLIC PANEL: CPT

## 2024-02-06 PROCEDURE — 36415 COLL VENOUS BLD VENIPUNCTURE: CPT

## 2024-02-06 PROCEDURE — 81003 URINALYSIS AUTO W/O SCOPE: CPT

## 2024-02-06 PROCEDURE — 84443 ASSAY THYROID STIM HORMONE: CPT

## 2024-02-07 ENCOUNTER — TELEPHONE (OUTPATIENT)
Dept: INTERNAL MEDICINE CLINIC | Facility: OTHER | Age: 54
End: 2024-02-07

## 2024-02-07 NOTE — TELEPHONE ENCOUNTER
Pt returned my call.  Informed her physician was out of office but did reach out to physician to relay concerns.  Informed pt per Dr. Maloney, nothing urgent and can address at pending appt 2/21/2024.  Informed her to contact office if she needs further assistance. Pt verbally acknowledged understanding

## 2024-02-07 NOTE — TELEPHONE ENCOUNTER
Dr Maloney,    Appt scheduled for 2/21/2024 to discuss d/p and recent A1c and other lab results..  Pt reports she still feels her b/p is elevated and losartan that was prescribed on 1/30/2024 is not effective.  She would also like to address anemia.  Do any changes need to be done now, please advise?    Do you want sooner appt or is 2/21 appropriate?    Please advise

## 2024-02-14 NOTE — TELEPHONE ENCOUNTER
Spoke with patient- aware of instructions- Patient states she will monitor BP and keep log to bring to next visit.

## 2024-02-19 PROBLEM — E11.9 TYPE 2 DIABETES MELLITUS, WITHOUT LONG-TERM CURRENT USE OF INSULIN (HCC): Status: ACTIVE | Noted: 2024-02-19

## 2024-02-19 PROBLEM — E11.9 TYPE 2 DIABETES MELLITUS, WITHOUT LONG-TERM CURRENT USE OF INSULIN (HCC): Chronic | Status: ACTIVE | Noted: 2024-02-19

## 2024-02-20 ENCOUNTER — RA CDI HCC (OUTPATIENT)
Dept: OTHER | Facility: HOSPITAL | Age: 54
End: 2024-02-20

## 2024-02-21 ENCOUNTER — OFFICE VISIT (OUTPATIENT)
Age: 54
End: 2024-02-21
Payer: COMMERCIAL

## 2024-02-21 VITALS
OXYGEN SATURATION: 98 % | SYSTOLIC BLOOD PRESSURE: 122 MMHG | HEART RATE: 78 BPM | TEMPERATURE: 97.9 F | DIASTOLIC BLOOD PRESSURE: 72 MMHG

## 2024-02-21 DIAGNOSIS — D50.9 MICROCYTIC ANEMIA: ICD-10-CM

## 2024-02-21 DIAGNOSIS — Z12.31 ENCOUNTER FOR SCREENING MAMMOGRAM FOR MALIGNANT NEOPLASM OF BREAST: ICD-10-CM

## 2024-02-21 DIAGNOSIS — E11.9 NEWLY DIAGNOSED DIABETES (HCC): Primary | ICD-10-CM

## 2024-02-21 DIAGNOSIS — M1A.0790 CHRONIC GOUT OF ANKLE, UNSPECIFIED CAUSE, UNSPECIFIED LATERALITY: ICD-10-CM

## 2024-02-21 DIAGNOSIS — I10 HTN (HYPERTENSION), BENIGN: ICD-10-CM

## 2024-02-21 PROCEDURE — 99214 OFFICE O/P EST MOD 30 MIN: CPT | Performed by: INTERNAL MEDICINE

## 2024-02-21 RX ORDER — LOSARTAN POTASSIUM 50 MG/1
50 TABLET ORAL DAILY
Qty: 90 TABLET | Refills: 1 | Status: SHIPPED | OUTPATIENT
Start: 2024-02-21

## 2024-02-21 NOTE — PATIENT INSTRUCTIONS
Get blood work done for iron panel and uric acid levels to see if we need to start a uric acid lowering medication and iron supplementation.  Get blood work done for the metabolic panel and A1c, fasting prior to next visit.  Will hold off on a statin at the present time.  Discussed ASCVD risk score.  In the meanwhile we will start fiber supplementation.

## 2024-02-21 NOTE — PROGRESS NOTES
Name: Elizabeth Fuentes      : 1970      MRN: [id number]  Encounter Provider: Zofia Maloney MD  Encounter Date: 2024   Encounter department: [facility] [place]    Assessment & Plan   #1 will discontinue hydrochlorothiazide combination Hyzaar.  Transition to losartan 50 mg a day.  Hopefully the symptoms of leg cramps and knee pain do not recur.  Please call us back if they do, we will need to then discontinue the losartan and look for alternate therapy.  #2 recheck blood work for uric acid levels and iron panel.  Then upon iron level will either give supplemental iron or continue with dietary supplementation.  #3 recommend ophthalmic evaluation for retinal examination.  Mild elevation of A1c in the diabetic range and average sugar of 148    I have spent 45 minutes with Patient  today in which greater than 50% of this time was spent in detail history and examination the patient and coordination of care regarding Diagnostic results, Prognosis, Risks and benefits of tx options, Intructions for management, Patient and family education, Importance of tx compliance, Risk factor reductions and Impressions.     1. Newly diagnosed diabetes (HCC)  -     Ambulatory Referral to Ophthalmology; Future  -     Albumin / creatinine urine ratio; Future  -     metFORMIN (GLUCOPHAGE) 500 mg tablet; Take 0.5 tablets (250 mg total) by mouth 2 (two) times a day with meals    2. HTN (hypertension), benign  -     losartan (COZAAR) 50 mg tablet; Take 1 tablet (50 mg total) by mouth daily    3. Encounter for screening mammogram for malignant neoplasm of breast  -     Mammo screening bilateral w 3d & cad; Future    4. Microcytic anemia  Assessment & Plan:  Recommend follow-up for screening colonoscopy.  Microcytic anemia will check iron panel.    Orders:  -     Iron Panel (Includes Ferritin, Iron Sat%, Iron, and TIBC); Future    5. Chronic gout of ankle, unspecified cause, unspecified  laterality  -     Uric acid; Future      BMI Counseling: There is no height or weight on file to calculate BMI. The BMI is above normal. Nutrition recommendations include reducing portion sizes, decreasing overall calorie intake, 3-5 servings of fruits/vegetables daily, reducing fast food intake, consuming healthier snacks, moderation in carbohydrate intake, increasing intake of lean protein, reducing intake of saturated fat and trans fat, and reducing intake of cholesterol. Exercise recommendations include moderate aerobic physical activity for 150 minutes/week.          Subjective     Chief Complaint   Patient presents with   • Follow-up     2 m f/u visit for hypertension, review labs form 1/17/24.  Mammogram scheduled 3/8/24, colonoscopy consult scheduled 6/25/24.    • Leg Pain     She c/o severe pain in legs since she started Losartan- HCT.          HPI    Patient is here today for follow-up hypertension and for lab review.  Was started on Hyzaar, combination of losartan and hydrochlorothiazide at last visit.  Additionally, she had severe pain in both her knees and her calves.  He said that this felt similar to previous gout flares did seem to correlate with her taking the Hyzaar.  Also has made big changes in her diet and plans to go mostly plant-based and try and lose lose weight.  Pain in her knees and her legs resolved after she stopped the Hyzaar.    Review of Systems   Musculoskeletal:  Positive for arthralgias and joint swelling.       Past Medical History:   Diagnosis Date   • Anemia    • Anxiety    • Arthritis    • Hypertension 1/1/24     Past Surgical History:   Procedure Laterality Date   • NO PAST SURGERIES       Family History   Problem Relation Age of Onset   • Diabetes Mother    • Hypertension Mother    • Rheum arthritis Mother    • Alcohol abuse Father    • Diabetes Father    • Anemia Sister    • Anxiety disorder Sister      Social History     Socioeconomic History   • Marital status: Single      Spouse name: None   • Number of children: None   • Years of education: None   • Highest education level: None   Occupational History   • None   Tobacco Use   • Smoking status: Never   • Smokeless tobacco: Never   Vaping Use   • Vaping status: Never Used   Substance and Sexual Activity   • Alcohol use: Not Currently     Alcohol/week: 1.0 standard drink of alcohol     Types: 1 Glasses of wine per week     Comment: Every 3 weeks   • Drug use: Never   • Sexual activity: Yes     Birth control/protection: Post-menopausal   Other Topics Concern   • None   Social History Narrative   • None     Social Determinants of Health     Financial Resource Strain: Not on file   Food Insecurity: Not on file   Transportation Needs: Not on file   Physical Activity: Not on file   Stress: Not on file   Social Connections: Not on file   Intimate Partner Violence: Not on file   Housing Stability: Not on file     Current Outpatient Medications on File Prior to Visit   Medication Sig   • ergocalciferol (VITAMIN D2) 50,000 units Take 1 capsule (50,000 Units total) by mouth once a week   • [DISCONTINUED] losartan-hydrochlorothiazide (HYZAAR) 50-12.5 mg per tablet Take 1 tablet by mouth daily   • [DISCONTINUED] buPROPion (WELLBUTRIN XL) 300 mg 24 hr tablet TAKE 1 EXTENDED RELEASE TABLET BY MOUTH IN THE MORNING FOR 30 DAYS. (Patient not taking: Reported on 2/21/2024)     Allergies   Allergen Reactions   • Peanut (Diagnostic) - Food Allergy Hives, Shortness Of Breath and Throat Swelling   • Cat Hair Extract Allergic Rhinitis   • Shrimp (Diagnostic) - Food Allergy Swelling     Immunization History   Administered Date(s) Administered   • COVID-19 PFIZER VACCINE 0.3 ML IM 12/16/2021, 12/16/2021   • Influenza, injectable, quadrivalent, preservative free 0.5 mL 01/30/2024       Objective     /72 (BP Location: Left arm, Patient Position: Sitting, Cuff Size: Large)   Pulse 78   Temp 97.9 °F (36.6 °C) (Temporal)   LMP  (LMP Unknown)   SpO2 98%      Physical Exam    Gen: NAD  Heent: atraumatic, normocephalic  Mouth: is moist  Neck: is supple, no JVD, no carotid bruits.   Heart: is regular Normal s1, s2,  Lungs: are clear to auscultation  Ext: trace edema, distal pulses normal  Skin: no rashes, ulcers  Mood: normal affect  Neuro: AAOx3   Zofia Maloney MD

## 2024-02-29 ENCOUNTER — ANNUAL EXAM (OUTPATIENT)
Dept: OBGYN CLINIC | Facility: CLINIC | Age: 54
End: 2024-02-29
Payer: COMMERCIAL

## 2024-02-29 VITALS — SYSTOLIC BLOOD PRESSURE: 130 MMHG | DIASTOLIC BLOOD PRESSURE: 84 MMHG

## 2024-02-29 DIAGNOSIS — Z11.51 SCREENING FOR HUMAN PAPILLOMAVIRUS (HPV): ICD-10-CM

## 2024-02-29 DIAGNOSIS — Z12.11 COLON CANCER SCREENING: ICD-10-CM

## 2024-02-29 DIAGNOSIS — Z01.419 ENCNTR FOR GYN EXAM (GENERAL) (ROUTINE) W/O ABN FINDINGS: Primary | ICD-10-CM

## 2024-02-29 DIAGNOSIS — N95.0 POSTMENOPAUSAL BLEEDING: ICD-10-CM

## 2024-02-29 PROCEDURE — G0145 SCR C/V CYTO,THINLAYER,RESCR: HCPCS | Performed by: OBSTETRICS & GYNECOLOGY

## 2024-02-29 PROCEDURE — S0612 ANNUAL GYNECOLOGICAL EXAMINA: HCPCS | Performed by: OBSTETRICS & GYNECOLOGY

## 2024-02-29 PROCEDURE — G0476 HPV COMBO ASSAY CA SCREEN: HCPCS | Performed by: OBSTETRICS & GYNECOLOGY

## 2024-02-29 NOTE — PROGRESS NOTES
Elizabeth Fuentes   1970    CC:  Yearly exam    S:  54 y.o. female here for yearly exam. She is postmenopausal.  She had an episode of vaginal bleeding three weeks ago.  She states this felt like a period but she is aware that she is officially menopausal.  She will go for ultrasound and consider whether she will want a biopsy either way or only if her lining is thickened or poorly visualized.      She denies vaginal discharge, itching, odor or dryness.     Sexual activity: She is sexually active without pain, bleeding or dryness.     Last Pap: unknown timing  Last Mammo: 7/12/2022 - BIRAD-1  Last Colonoscopy:   Last DEXA: never    We reviewed ASCCP guidelines for Pap testing.       Current Outpatient Medications:     ergocalciferol (VITAMIN D2) 50,000 units, Take 1 capsule (50,000 Units total) by mouth once a week, Disp: 12 capsule, Rfl: 1    losartan (COZAAR) 50 mg tablet, Take 1 tablet (50 mg total) by mouth daily, Disp: 90 tablet, Rfl: 1    metFORMIN (GLUCOPHAGE) 500 mg tablet, Take 0.5 tablets (250 mg total) by mouth 2 (two) times a day with meals, Disp: 90 tablet, Rfl: 1  Social History     Socioeconomic History    Marital status: Single     Spouse name: Not on file    Number of children: Not on file    Years of education: Not on file    Highest education level: Not on file   Occupational History    Not on file   Tobacco Use    Smoking status: Never    Smokeless tobacco: Never   Vaping Use    Vaping status: Never Used   Substance and Sexual Activity    Alcohol use: Not Currently     Alcohol/week: 1.0 standard drink of alcohol     Types: 1 Glasses of wine per week     Comment: Every 3 weeks    Drug use: Never    Sexual activity: Yes     Partners: Male     Birth control/protection: Post-menopausal   Other Topics Concern    Not on file   Social History Narrative    Not on file     Social Determinants of Health     Financial Resource Strain: Not on file   Food Insecurity: Not on file   Transportation Needs:  Not on file   Physical Activity: Not on file   Stress: Not on file   Social Connections: Not on file   Intimate Partner Violence: Not on file   Housing Stability: Not on file     Family History   Problem Relation Age of Onset    Diabetes Mother     Hypertension Mother     Rheum arthritis Mother     Alcohol abuse Father     Diabetes Father     Anemia Sister     Anxiety disorder Sister      Past Medical History:   Diagnosis Date    Anemia     Anxiety     Arthritis     Hypertension 1/1/24        Review of Systems   Respiratory: Negative.    Cardiovascular: Negative.    Gastrointestinal: Negative for constipation and diarrhea.   Genitourinary: Negative for difficulty urinating, pelvic pain, vaginal bleeding, vaginal discharge, itching or odor.    O:  Blood pressure 130/84.    Patient appears well and is not in distress  Neck is supple without masses  Breasts are symmetrical without mass, tenderness, nipple discharge, skin changes or adenopathy.   Abdomen is soft and nontender without masses.   External genitals are normal without lesions or rashes.  Urethral meatus and urethra are normal  Bladder is normal to palpation  Vagina is normal without discharge or bleeding.   Cervix is normal without discharge or lesion.   Uterus is normal, mobile, nontender without palpable mass.  Exam limited by body habitus.   Adnexa are normal, nontender, without palpable mass. Exam limited by body habitus.     A:   Yearly exam.     P:   Pap with HPV sent - will call with results  Mammo ordered by PCP   Colonoscopy declined; will do Cologard   DEXA due age 63   Pelvic US ordered    RTO one year for yearly exam or sooner as needed.

## 2024-03-04 LAB
HPV HR 12 DNA CVX QL NAA+PROBE: POSITIVE
HPV16 DNA CVX QL NAA+PROBE: NEGATIVE
HPV18 DNA CVX QL NAA+PROBE: NEGATIVE

## 2024-03-06 LAB
LAB AP GYN PRIMARY INTERPRETATION: NORMAL
Lab: NORMAL
PATH INTERP SPEC-IMP: NORMAL

## 2024-04-04 ENCOUNTER — VBI (OUTPATIENT)
Dept: ADMINISTRATIVE | Facility: OTHER | Age: 54
End: 2024-04-04

## 2024-04-09 ENCOUNTER — TELEPHONE (OUTPATIENT)
Dept: INTERNAL MEDICINE CLINIC | Age: 54
End: 2024-04-09

## 2024-04-09 NOTE — TELEPHONE ENCOUNTER
Received [facility] coding errors paper    Scanning into media and sending to Dr Maloney to correct

## 2024-04-19 ENCOUNTER — VBI (OUTPATIENT)
Dept: ADMINISTRATIVE | Facility: OTHER | Age: 54
End: 2024-04-19

## 2024-04-21 PROBLEM — Z12.12 ENCOUNTER FOR COLORECTAL CANCER SCREENING: Status: RESOLVED | Noted: 2022-03-02 | Resolved: 2024-04-21

## 2024-04-21 PROBLEM — Z12.11 ENCOUNTER FOR COLORECTAL CANCER SCREENING: Status: RESOLVED | Noted: 2022-03-02 | Resolved: 2024-04-21

## 2024-04-29 ENCOUNTER — APPOINTMENT (OUTPATIENT)
Dept: LAB | Facility: MEDICAL CENTER | Age: 54
End: 2024-04-29
Payer: COMMERCIAL

## 2024-04-29 DIAGNOSIS — M1A.0790 CHRONIC GOUT OF ANKLE, UNSPECIFIED CAUSE, UNSPECIFIED LATERALITY: ICD-10-CM

## 2024-04-29 DIAGNOSIS — D50.9 MICROCYTIC ANEMIA: ICD-10-CM

## 2024-04-29 DIAGNOSIS — E11.9 NEWLY DIAGNOSED DIABETES (HCC): ICD-10-CM

## 2024-04-29 LAB
CREAT UR-MCNC: 138.2 MG/DL
FERRITIN SERPL-MCNC: 83 NG/ML (ref 11–307)
IRON SATN MFR SERPL: 20 % (ref 15–50)
IRON SERPL-MCNC: 66 UG/DL (ref 50–212)
MICROALBUMIN UR-MCNC: <7 MG/L
MICROALBUMIN/CREAT 24H UR: <5 MG/G CREATININE (ref 0–30)
TIBC SERPL-MCNC: 323 UG/DL (ref 250–450)
UIBC SERPL-MCNC: 257 UG/DL (ref 155–355)
URATE SERPL-MCNC: 7.4 MG/DL (ref 2–7.5)

## 2024-04-29 PROCEDURE — 36415 COLL VENOUS BLD VENIPUNCTURE: CPT

## 2024-04-29 PROCEDURE — 83540 ASSAY OF IRON: CPT

## 2024-04-29 PROCEDURE — 83550 IRON BINDING TEST: CPT

## 2024-04-29 PROCEDURE — 82570 ASSAY OF URINE CREATININE: CPT

## 2024-04-29 PROCEDURE — 84550 ASSAY OF BLOOD/URIC ACID: CPT

## 2024-04-29 PROCEDURE — 82043 UR ALBUMIN QUANTITATIVE: CPT

## 2024-04-29 PROCEDURE — 82728 ASSAY OF FERRITIN: CPT

## 2024-05-01 ENCOUNTER — TELEPHONE (OUTPATIENT)
Age: 54
End: 2024-05-01

## 2024-05-01 ENCOUNTER — OFFICE VISIT (OUTPATIENT)
Dept: INTERNAL MEDICINE CLINIC | Facility: OTHER | Age: 54
End: 2024-05-01
Payer: COMMERCIAL

## 2024-05-01 VITALS
DIASTOLIC BLOOD PRESSURE: 80 MMHG | SYSTOLIC BLOOD PRESSURE: 134 MMHG | TEMPERATURE: 98.8 F | OXYGEN SATURATION: 99 % | HEART RATE: 88 BPM

## 2024-05-01 DIAGNOSIS — G89.29 CHRONIC PAIN OF RIGHT KNEE: Primary | ICD-10-CM

## 2024-05-01 DIAGNOSIS — I10 HTN (HYPERTENSION), BENIGN: ICD-10-CM

## 2024-05-01 DIAGNOSIS — M25.561 CHRONIC PAIN OF RIGHT KNEE: Primary | ICD-10-CM

## 2024-05-01 PROCEDURE — 3079F DIAST BP 80-89 MM HG: CPT | Performed by: NURSE PRACTITIONER

## 2024-05-01 PROCEDURE — 99214 OFFICE O/P EST MOD 30 MIN: CPT | Performed by: NURSE PRACTITIONER

## 2024-05-01 PROCEDURE — 3075F SYST BP GE 130 - 139MM HG: CPT | Performed by: NURSE PRACTITIONER

## 2024-05-01 RX ORDER — BUPROPION HYDROCHLORIDE 300 MG/1
300 TABLET ORAL EVERY MORNING
COMMUNITY
Start: 2024-04-03

## 2024-05-01 RX ORDER — UREA 10 %
325 LOTION (ML) TOPICAL
COMMUNITY

## 2024-05-01 RX ORDER — AMLODIPINE BESYLATE 10 MG/1
10 TABLET ORAL DAILY
Qty: 30 TABLET | Refills: 1 | Status: SHIPPED | OUTPATIENT
Start: 2024-05-01

## 2024-05-01 RX ORDER — ATOMOXETINE 25 MG/1
25 CAPSULE ORAL DAILY
COMMUNITY
Start: 2024-04-05

## 2024-05-01 NOTE — PATIENT INSTRUCTIONS
Start amlodipine 1/2 tablet x 1 week then full tablet   Follow up in 4 weeks with BP log    Go for xray right knee

## 2024-05-01 NOTE — TELEPHONE ENCOUNTER
Pt. Called she missed her appt. Today. Made appt. for June 26th but then connected her with the office for overbook availability

## 2024-05-01 NOTE — ASSESSMENT & PLAN NOTE
-Discontinue losartan due to side effects of myalgias  -Start amlodipine 10 mg daily.  Start with half tablet x 1 week then increase to full tablet.  Monitor blood pressure at home and record in log to bring with to follow-up in 4 weeks  -Follow-up with PCP in 4 weeks  -Discussed recommendation for weight loss, she is scheduled with weight management  -Recommend low sodium, high potassium diet

## 2024-05-01 NOTE — PROGRESS NOTES
Assessment/Plan:    Problem List Items Addressed This Visit       Chronic pain of right knee - Primary     -Previously following with Ortho  -Update x-ray of right knee, follow results         Relevant Orders    XR knee 3 vw right non injury    HTN (hypertension), benign     -Discontinue losartan due to side effects of myalgias  -Start amlodipine 10 mg daily.  Start with half tablet x 1 week then increase to full tablet.  Monitor blood pressure at home and record in log to bring with to follow-up in 4 weeks  -Follow-up with PCP in 4 weeks  -Discussed recommendation for weight loss, she is scheduled with weight management  -Recommend low sodium, high potassium diet         Relevant Medications    amLODIPine (NORVASC) 10 mg tablet          M*Modal software was used to dictate this note.  It may contain errors with dictating incorrect words or incorrect spelling. Please contact the provider directly with any questions.    Subjective:      Patient ID: Elizabeth Fuentes is a 54 y.o. female.    HPI    Patient presents today for follow up HTN  She was initially started on losartan-HCTZ and had severe body pain. She saw her PCP and was switched to losartan and after discontinuing her HCTZ the full body pain resolved. Now that she is only on losartan 50mg she continues with bilateral thigh pain.   She states her sister and mother are on amlodipine.       The following portions of the patient's history were reviewed and updated as appropriate: allergies, current medications, past family history, past medical history, past social history, past surgical history, and problem list.    Review of Systems      Past Medical History:   Diagnosis Date    Anemia     Anxiety     Arthritis     Hypertension 1/1/24         Current Outpatient Medications:     amLODIPine (NORVASC) 10 mg tablet, Take 1 tablet (10 mg total) by mouth daily, Disp: 30 tablet, Rfl: 1    atoMOXetine (STRATTERA) 25 mg capsule, Take 25 mg by mouth daily, Disp: , Rfl:      buPROPion (WELLBUTRIN XL) 300 mg 24 hr tablet, Take 300 mg by mouth every morning, Disp: , Rfl:     ergocalciferol (VITAMIN D2) 50,000 units, Take 1 capsule (50,000 Units total) by mouth once a week, Disp: 12 capsule, Rfl: 1    ferrous sulfate 325 (65 FE) MG EC tablet, Take 325 mg by mouth daily with breakfast, Disp: , Rfl:     metFORMIN (GLUCOPHAGE) 500 mg tablet, Take 0.5 tablets (250 mg total) by mouth 2 (two) times a day with meals (Patient not taking: Reported on 5/1/2024), Disp: 90 tablet, Rfl: 1    Allergies   Allergen Reactions    Peanut (Diagnostic) - Food Allergy Hives, Shortness Of Breath and Throat Swelling    Cat Hair Extract Allergic Rhinitis    Shrimp (Diagnostic) - Food Allergy Swelling       Social History   Past Surgical History:   Procedure Laterality Date    NO PAST SURGERIES       Family History   Problem Relation Age of Onset    Diabetes Mother     Hypertension Mother     Rheum arthritis Mother     Alcohol abuse Father     Diabetes Father     Anemia Sister     Anxiety disorder Sister        Objective:  /80 (BP Location: Right arm, Patient Position: Sitting, Cuff Size: Large)   Pulse 88   Temp 98.8 °F (37.1 °C) (Temporal)   LMP  (LMP Unknown)   SpO2 99%      Physical Exam  Vitals reviewed.   Constitutional:       General: She is not in acute distress.     Appearance: Normal appearance. She is obese. She is not diaphoretic.   HENT:      Head: Normocephalic and atraumatic.   Eyes:      Extraocular Movements: Extraocular movements intact.      Conjunctiva/sclera: Conjunctivae normal.      Pupils: Pupils are equal, round, and reactive to light.   Cardiovascular:      Rate and Rhythm: Normal rate and regular rhythm.      Heart sounds: Normal heart sounds. No murmur heard.  Pulmonary:      Effort: Pulmonary effort is normal. No respiratory distress.      Breath sounds: Normal breath sounds. No wheezing, rhonchi or rales.   Musculoskeletal:      Right knee: Tenderness present over the PCL  and patellar tendon.   Neurological:      Mental Status: She is alert and oriented to person, place, and time. Mental status is at baseline.   Psychiatric:         Mood and Affect: Mood normal.         Behavior: Behavior normal.

## 2024-06-03 RX ORDER — SODIUM FLUORIDE 6 MG/ML
PASTE, DENTIFRICE DENTAL
COMMUNITY
Start: 2024-04-26

## 2024-06-04 ENCOUNTER — OFFICE VISIT (OUTPATIENT)
Age: 54
End: 2024-06-04
Payer: COMMERCIAL

## 2024-06-04 ENCOUNTER — TELEPHONE (OUTPATIENT)
Age: 54
End: 2024-06-04

## 2024-06-04 VITALS
SYSTOLIC BLOOD PRESSURE: 114 MMHG | OXYGEN SATURATION: 97 % | TEMPERATURE: 98.4 F | DIASTOLIC BLOOD PRESSURE: 68 MMHG | HEART RATE: 78 BPM

## 2024-06-04 DIAGNOSIS — E11.9 TYPE 2 DIABETES MELLITUS WITHOUT COMPLICATION, WITHOUT LONG-TERM CURRENT USE OF INSULIN (HCC): ICD-10-CM

## 2024-06-04 DIAGNOSIS — R22.41 LOCALIZED SWELLING OF RIGHT LOWER EXTREMITY: ICD-10-CM

## 2024-06-04 DIAGNOSIS — E79.0 HYPERURICEMIA W/O SIGNS OF INFLAM ARTHRIT AND TOPHACEOUS DIS: ICD-10-CM

## 2024-06-04 DIAGNOSIS — G89.29 CHRONIC PAIN OF BOTH KNEES: ICD-10-CM

## 2024-06-04 DIAGNOSIS — M25.562 CHRONIC PAIN OF BOTH KNEES: ICD-10-CM

## 2024-06-04 DIAGNOSIS — M25.561 CHRONIC PAIN OF BOTH KNEES: ICD-10-CM

## 2024-06-04 DIAGNOSIS — R73.9 HYPERGLYCEMIA: ICD-10-CM

## 2024-06-04 DIAGNOSIS — I10 HTN (HYPERTENSION), BENIGN: Primary | ICD-10-CM

## 2024-06-04 PROCEDURE — 99214 OFFICE O/P EST MOD 30 MIN: CPT | Performed by: INTERNAL MEDICINE

## 2024-06-04 RX ORDER — TRIAMTERENE AND HYDROCHLOROTHIAZIDE 37.5; 25 MG/1; MG/1
1 CAPSULE ORAL DAILY
Qty: 30 CAPSULE | Refills: 5 | Status: SHIPPED | OUTPATIENT
Start: 2024-06-04 | End: 2024-12-01

## 2024-06-04 RX ORDER — ALLOPURINOL 100 MG/1
100 TABLET ORAL DAILY
Qty: 90 TABLET | Refills: 3 | Status: SHIPPED | OUTPATIENT
Start: 2024-06-04

## 2024-06-04 RX ORDER — AMLODIPINE BESYLATE 10 MG/1
5 TABLET ORAL DAILY
Start: 2024-06-04

## 2024-06-04 NOTE — PROGRESS NOTES
Ambulatory Visit  Name: Elizabeth Fuentes      : 1970      MRN: [id number]  Encounter Provider: Zofia Maloney MD  Encounter Date: 2024   Encounter department: [facility] [place]    Assessment & Plan   1. HTN (hypertension), benign  Assessment & Plan:  Hypertension is well-controlled on amlodipine 10 mg daily.  However due to edema secondary to the increase in the dose of amlodipine has caused significant discomfort especially in the right leg.  We will reduce the dosage of amlodipine to 5 mg daily again.  Will start a diuretic with a combination of triamterene and hydrochlorothiazide to prevent hypokalemia.  Also recommended adequate hydration.  Continue to monitor symptoms.  Orders:  -     triamterene-hydrochlorothiazide (DYAZIDE) 37.5-25 mg per capsule; Take 1 capsule by mouth daily  -     amLODIPine (NORVASC) 10 mg tablet; Take 0.5 tablets (5 mg total) by mouth daily  2. Hyperuricemia w/o signs of inflam arthrit and tophaceous dis  Assessment & Plan:  Uric acid levels are greater than 7.  Plan to start small dose of allopurinol 100 mg a day in addition to dietary modification.  Also encouraged her to have adequate intake to prevent crystal formation in the urine and kidneys.  Orders:  -     allopurinol (ZYLOPRIM) 100 mg tablet; Take 1 tablet (100 mg total) by mouth daily  3. Hyperglycemia  4. Chronic pain of both knees  5. Localized swelling of right lower extremity  -     VAS VENOUS DUPLEX -LOWER LIMB UNILATERAL; Future; Expected date: 2024  6. Type 2 diabetes mellitus without complication, without long-term current use of insulin ([facility])  Assessment & Plan:  >>ASSESSMENT AND PLAN FOR HYPERGLYCEMIA WRITTEN ON 2024  3:08 PM BY ZOFIA MALONEY MD    Elizabeth is reluctant at the present time to get started on the metformin.  She would like to see how the other medications play out.  I agree with holding out for now while adjusting her diet to have less  carbohydrate intake.           History of Present Illness     HPI    Here today for follow up of HTN , HLD and AODM.  Recently seen for follow up and Amlodipine was increased to 10 mg since her BP was not well controlled .  However, that has caused significant peripheral edema and also pain in her right calf.  Was seen by Gyn and advised uterine sono for post menopausal bleeding to assess uterine stripe.    Review of Systems   Constitutional:  Positive for activity change.   Cardiovascular:  Positive for leg swelling.   Musculoskeletal:  Positive for arthralgias, gait problem and joint swelling.   All other systems reviewed and are negative.    Past Medical History:   Diagnosis Date   • Anemia    • Anxiety    • Arthritis    • Hypertension 1/1/24     Past Surgical History:   Procedure Laterality Date   • NO PAST SURGERIES       Family History   Problem Relation Age of Onset   • Diabetes Mother    • Hypertension Mother    • Rheum arthritis Mother    • Alcohol abuse Father    • Diabetes Father    • Anemia Sister    • Anxiety disorder Sister      Social History     Tobacco Use   • Smoking status: Never   • Smokeless tobacco: Never   Vaping Use   • Vaping status: Never Used   Substance and Sexual Activity   • Alcohol use: Not Currently     Alcohol/week: 1.0 standard drink of alcohol     Types: 1 Glasses of wine per week     Comment: Every 3 weeks   • Drug use: Never   • Sexual activity: Yes     Partners: Male     Birth control/protection: Post-menopausal     Current Outpatient Medications on File Prior to Visit   Medication Sig   • atoMOXetine (STRATTERA) 25 mg capsule Take 25 mg by mouth daily   • buPROPion (WELLBUTRIN XL) 300 mg 24 hr tablet Take 300 mg by mouth every morning   • ergocalciferol (VITAMIN D2) 50,000 units Take 1 capsule (50,000 Units total) by mouth once a week   • ferrous sulfate 325 (65 FE) MG EC tablet Take 325 mg by mouth daily with breakfast   • Sodium Fluoride 5000 PPM 1.1 % PSTE Use as directed    • [DISCONTINUED] amLODIPine (NORVASC) 10 mg tablet Take 1 tablet (10 mg total) by mouth daily   • metFORMIN (GLUCOPHAGE) 500 mg tablet Take 0.5 tablets (250 mg total) by mouth 2 (two) times a day with meals (Patient not taking: Reported on 5/1/2024)     Allergies   Allergen Reactions   • Peanut (Diagnostic) - Food Allergy Hives, Shortness Of Breath and Throat Swelling   • Cat Hair Extract Allergic Rhinitis   • Shrimp (Diagnostic) - Food Allergy Swelling     Immunization History   Administered Date(s) Administered   • COVID-19 PFIZER VACCINE 0.3 ML IM 12/16/2021, 12/16/2021   • Influenza, injectable, quadrivalent, preservative free 0.5 mL 01/30/2024     Objective     /68 (BP Location: Left arm, Patient Position: Sitting, Cuff Size: Large)   Pulse 78   Temp 98.4 °F (36.9 °C) (Temporal)   LMP  (LMP Unknown)   SpO2 97%       Chief Complaint   Patient presents with   • Follow-up     3 m f/u visit for diabetes, review labs from 4/29/24.    She will schedule a Mammogram and do the cologuard within the next few months.        Physical Exam    Gen: NAD,  elevated BMI  Heent: atraumatic, normocephalic, sclera is clear and PERRLA  Mouth: is moist  Neck: is supple, no JVD, no carotid bruits.   Heart: is regular Normal s1, s2, no murmurs, rubs, clicks or gallops.   Lungs: are clear to auscultation and percussion.  Abd: soft, nontender, normal bowel sounds with no masses or organomegaly. No abdominal bruits  Ext: pitting edema, R greater than left with some left calf pain  Skin: no rashes, ulcers  Mood: normal affect  Neuro: AAOx3   Administrative Statements   I have spent a total time of 35 minutes on 06/04/24 In caring for this patient including Diagnostic results, Prognosis, Risks and benefits of tx options, Instructions for management, Patient and family education, Importance of tx compliance, Risk factor reductions, Impressions, Counseling / Coordination of care, Documenting in the medical record, Reviewing /  ordering tests, medicine, procedures  , and Obtaining or reviewing history  .

## 2024-06-04 NOTE — ASSESSMENT & PLAN NOTE
Uric acid levels are greater than 7.  Plan to start small dose of allopurinol 100 mg a day in addition to dietary modification.  Also encouraged her to have adequate intake to prevent crystal formation in the urine and kidneys.

## 2024-06-04 NOTE — ASSESSMENT & PLAN NOTE
>>ASSESSMENT AND PLAN FOR HYPERGLYCEMIA WRITTEN ON 6/4/2024  3:08 PM BY LENA CRUZ MD    Elizabeth is reluctant at the present time to get started on the metformin.  She would like to see how the other medications play out.  I agree with holding out for now while adjusting her diet to have less carbohydrate intake.

## 2024-06-04 NOTE — ASSESSMENT & PLAN NOTE
Hypertension is well-controlled on amlodipine 10 mg daily.  However due to edema secondary to the increase in the dose of amlodipine has caused significant discomfort especially in the right leg.  We will reduce the dosage of amlodipine to 5 mg daily again.  Will start a diuretic with a combination of triamterene and hydrochlorothiazide to prevent hypokalemia.  Also recommended adequate hydration.  Continue to monitor symptoms.

## 2024-06-04 NOTE — ASSESSMENT & PLAN NOTE
Elizabeth is reluctant at the present time to get started on the metformin.  She would like to see how the other medications play out.  I agree with holding out for now while adjusting her diet to have less carbohydrate intake.

## 2024-06-05 ENCOUNTER — VBI (OUTPATIENT)
Dept: ADMINISTRATIVE | Facility: OTHER | Age: 54
End: 2024-06-05

## 2024-06-12 ENCOUNTER — TELEPHONE (OUTPATIENT)
Age: 54
End: 2024-06-12

## 2024-06-14 ENCOUNTER — HOSPITAL ENCOUNTER (OUTPATIENT)
Dept: VASCULAR ULTRASOUND | Facility: HOSPITAL | Age: 54
Discharge: HOME | End: 2024-06-14
Attending: INTERNAL MEDICINE
Payer: COMMERCIAL

## 2024-06-14 DIAGNOSIS — R22.41 LOCALIZED SWELLING OF RIGHT LOWER EXTREMITY: ICD-10-CM

## 2024-06-14 LAB
LEFT EYE DIABETIC RETINOPATHY: NORMAL
RIGHT EYE DIABETIC RETINOPATHY: NORMAL

## 2024-06-14 PROCEDURE — 93971 EXTREMITY STUDY: CPT

## 2024-06-14 PROCEDURE — 93971 EXTREMITY STUDY: CPT | Performed by: SURGERY

## 2024-06-28 DIAGNOSIS — I10 HTN (HYPERTENSION), BENIGN: ICD-10-CM

## 2024-06-28 RX ORDER — AMLODIPINE BESYLATE 10 MG/1
10 TABLET ORAL DAILY
Qty: 30 TABLET | Refills: 5 | Status: SHIPPED | OUTPATIENT
Start: 2024-06-28

## 2024-09-03 DIAGNOSIS — E11.9 NEWLY DIAGNOSED DIABETES (HCC): ICD-10-CM

## 2024-09-04 ENCOUNTER — OFFICE VISIT (OUTPATIENT)
Age: 54
End: 2024-09-04
Payer: COMMERCIAL

## 2024-09-04 VITALS
HEART RATE: 80 BPM | SYSTOLIC BLOOD PRESSURE: 134 MMHG | BODY MASS INDEX: 48.39 KG/M2 | TEMPERATURE: 97.4 F | HEIGHT: 63 IN | DIASTOLIC BLOOD PRESSURE: 80 MMHG | OXYGEN SATURATION: 98 %

## 2024-09-04 DIAGNOSIS — E11.9 TYPE 2 DIABETES MELLITUS WITHOUT COMPLICATION, WITHOUT LONG-TERM CURRENT USE OF INSULIN (HCC): ICD-10-CM

## 2024-09-04 DIAGNOSIS — I10 HTN (HYPERTENSION), BENIGN: Primary | ICD-10-CM

## 2024-09-04 DIAGNOSIS — Z12.11 SCREENING FOR COLON CANCER: ICD-10-CM

## 2024-09-04 PROCEDURE — 99214 OFFICE O/P EST MOD 30 MIN: CPT | Performed by: INTERNAL MEDICINE

## 2024-09-04 RX ORDER — AMLODIPINE BESYLATE 5 MG/1
5 TABLET ORAL DAILY
Start: 2024-09-04

## 2024-09-04 RX ORDER — VENLAFAXINE HYDROCHLORIDE 37.5 MG/1
37.5 CAPSULE, EXTENDED RELEASE ORAL DAILY
COMMUNITY
Start: 2024-06-28

## 2024-09-04 NOTE — ASSESSMENT & PLAN NOTE
Discussed colonoscopy with patient.  She is unwilling at this present time recommend consideration for Cologard

## 2024-09-04 NOTE — ASSESSMENT & PLAN NOTE
Lab Results   Component Value Date    HGBA1C 6.8 (H) 02/06/2024     Discussed use of semaglutide for treatment of diabetes and management of obesity.  Elizabeth's sister is on it and she will consider it.

## 2024-09-04 NOTE — PROGRESS NOTES
Diabetic Foot Exam    Patient's shoes and socks removed.    Right Foot/Ankle   Right Foot Inspection  Skin Exam: skin normal and skin intact. No dry skin, no warmth, no callus, no erythema, no maceration, no abnormal color, no pre-ulcer, no ulcer and no callus.     Toe Exam: ROM and strength within normal limits. No swelling, no tenderness, erythema and  no right toe deformity    Sensory   Monofilament testing: intact    Left Foot/Ankle  Left Foot Inspection  Skin Exam: skin normal and skin intact. No dry skin, no warmth, no erythema, no maceration, normal color, no pre-ulcer, no ulcer and no callus.     Toe Exam: ROM and strength within normal limits. No swelling, no tenderness, no erythema and no left toe deformity.     Sensory   Monofilament testing: intact    Assign Risk Category  No deformity present  No loss of protective sensation

## 2024-09-04 NOTE — PROGRESS NOTES
Ambulatory Visit  Name: Elizabeth Fuentes      : 1970      MRN: [id number]  Encounter Provider: Zofia Maloney MD  Encounter Date: 2024   Encounter department: [facility] [place]    Assessment & Plan   1. HTN (hypertension), benign  -     amLODIPine (NORVASC) 5 mg tablet; Take 1 tablet (5 mg total) by mouth daily  2. Type 2 diabetes mellitus without complication, without long-term current use of insulin (HCC)  Assessment & Plan:    Lab Results   Component Value Date    HGBA1C 6.8 (H) 2024     Discussed use of semaglutide for treatment of diabetes and management of obesity.  Elizabeth's sister is on it and she will consider it.  Orders:  -     Albumin / creatinine urine ratio; Future; Expected date: 2024  -     Basic metabolic panel; Future; Expected date: 2024  -     Hemoglobin A1C; Future; Expected date: 2024  3. Screening for colon cancer  Assessment & Plan:  Discussed colonoscopy with patient.  She is unwilling at this present time recommend consideration for Cologard     Chief Complaint   Patient presents with   • Follow-up     3 month follow up   Has a physical form for work    • Hypertension   • Diabetes     Due for eye EXAM .   Foot exam- COMPLETED   • Health Maint.     Mammogram and Colorectal Cancer Screenings due.        History of Present Illness     HPI      Review of Systems  Past Medical History:   Diagnosis Date   • Anemia    • Anxiety    • Arthritis    • Hypertension 22     Past Surgical History:   Procedure Laterality Date   • NO PAST SURGERIES       Family History   Problem Relation Age of Onset   • Diabetes Mother    • Hypertension Mother    • Rheum arthritis Mother    • Alcohol abuse Father    • Diabetes Father    • Anemia Sister    • Anxiety disorder Sister      Social History     Tobacco Use   • Smoking status: Never   • Smokeless tobacco: Never   Vaping Use   • Vaping status: Never Used   Substance and Sexual Activity   •  Alcohol use: Not Currently     Alcohol/week: 1.0 standard drink of alcohol     Types: 1 Glasses of wine per week     Comment: Every 3 weeks   • Drug use: Never   • Sexual activity: Yes     Partners: Male     Birth control/protection: Post-menopausal     Current Outpatient Medications on File Prior to Visit   Medication Sig   • allopurinol (ZYLOPRIM) 100 mg tablet Take 1 tablet (100 mg total) by mouth daily   • atoMOXetine (STRATTERA) 25 mg capsule Take 25 mg by mouth daily   • buPROPion (WELLBUTRIN XL) 300 mg 24 hr tablet Take 300 mg by mouth every morning   • ergocalciferol (VITAMIN D2) 50,000 units Take 1 capsule (50,000 Units total) by mouth once a week   • ferrous sulfate 325 (65 FE) MG EC tablet Take 325 mg by mouth daily with breakfast   • metFORMIN (GLUCOPHAGE) 500 mg tablet TAKE 0.5 TABLETS BY MOUTH 2 TIMES A DAY WITH MEALS.   • Sodium Fluoride 5000 PPM 1.1 % PSTE Use as directed   • triamterene-hydrochlorothiazide (DYAZIDE) 37.5-25 mg per capsule Take 1 capsule by mouth daily   • venlafaxine (EFFEXOR-XR) 37.5 mg 24 hr capsule Take 37.5 mg by mouth daily   • [DISCONTINUED] amLODIPine (NORVASC) 10 mg tablet TAKE 1 TABLET BY MOUTH EVERY DAY (Patient taking differently: Take 5 mg by mouth daily)   • [DISCONTINUED] metFORMIN (GLUCOPHAGE) 500 mg tablet Take 0.5 tablets (250 mg total) by mouth 2 (two) times a day with meals (Patient not taking: Reported on 5/1/2024)     Allergies   Allergen Reactions   • Peanut (Diagnostic) - Food Allergy Hives, Shortness Of Breath and Throat Swelling   • Cat Hair Extract Allergic Rhinitis   • Shrimp (Diagnostic) - Food Allergy Swelling     Immunization History   Administered Date(s) Administered   • COVID-19 PFIZER VACCINE 0.3 ML IM 12/16/2021, 12/16/2021   • Influenza, injectable, quadrivalent, preservative free 0.5 mL 01/30/2024     Objective     /80 (BP Location: Left arm, Patient Position: Sitting, Cuff Size: Large)   Pulse 80   Temp (!) 97.4 °F (36.3 °C) (Temporal)   " Ht 5' 3\" (1.6 m)   LMP  (LMP Unknown)   SpO2 98%   BMI 48.39 kg/m²     Physical Exam    Gen: healthy appearing, overweight  Heent: atraumatic, normocephalic, sclera is clear and PERRLA  Mouth: is moist  Neck: is supple, no JVD, no carotid bruits.   Heart: is regular Normal s1, s2, no murmurs, rubs, clicks or gallops.   Lungs: are clear to auscultation and percussion.  Abd: soft, nontender, normal bowel sounds with no masses or organomegaly. No abdominal bruits  Ext: no edema, distal pulses normal  Skin: no rashes, ulcers  Mood: normal affect  Neuro: AAOx3         "

## 2024-10-04 PROBLEM — Z12.11 SCREENING FOR COLON CANCER: Status: RESOLVED | Noted: 2024-01-30 | Resolved: 2024-10-04

## 2024-10-15 ENCOUNTER — VBI (OUTPATIENT)
Dept: ADMINISTRATIVE | Facility: OTHER | Age: 54
End: 2024-10-15

## 2024-10-15 NOTE — TELEPHONE ENCOUNTER
10/15/24 7:32 AM     Chart reviewed for Diabetic Eye Exam ; nothing is submitted to the patient's insurance at this time.     RICHA PATEL MA   [facility]

## 2024-12-04 ENCOUNTER — OFFICE VISIT (OUTPATIENT)
Dept: OBGYN CLINIC | Facility: MEDICAL CENTER | Age: 54
End: 2024-12-04
Payer: COMMERCIAL

## 2024-12-04 ENCOUNTER — APPOINTMENT (OUTPATIENT)
Dept: RADIOLOGY | Facility: MEDICAL CENTER | Age: 54
End: 2024-12-04
Payer: COMMERCIAL

## 2024-12-04 VITALS
WEIGHT: 273 LBS | SYSTOLIC BLOOD PRESSURE: 138 MMHG | HEART RATE: 78 BPM | BODY MASS INDEX: 48.37 KG/M2 | HEIGHT: 63 IN | DIASTOLIC BLOOD PRESSURE: 84 MMHG

## 2024-12-04 DIAGNOSIS — M17.11 PRIMARY OSTEOARTHRITIS OF RIGHT KNEE: ICD-10-CM

## 2024-12-04 DIAGNOSIS — G89.29 CHRONIC PAIN OF BOTH KNEES: ICD-10-CM

## 2024-12-04 DIAGNOSIS — M25.561 CHRONIC PAIN OF BOTH KNEES: ICD-10-CM

## 2024-12-04 DIAGNOSIS — M25.562 CHRONIC PAIN OF BOTH KNEES: ICD-10-CM

## 2024-12-04 DIAGNOSIS — M95.8 OSTEOCHONDRAL DEFECT OF ANKLE: Primary | ICD-10-CM

## 2024-12-04 PROCEDURE — 99214 OFFICE O/P EST MOD 30 MIN: CPT | Performed by: PHYSICAL MEDICINE & REHABILITATION

## 2024-12-04 PROCEDURE — 73562 X-RAY EXAM OF KNEE 3: CPT

## 2024-12-04 NOTE — LETTER
To Whom It May Concern,     Elizabeth Fuentes is under my professional care.  She was seen in my office on December 4, 2024.       She is cleared to return to work with the following restrictions:     No restraining.     Please excuse Elizabeth Fuentes from any work missed on this appointment date.     If you have any questions or concerns, please do not hesitate to call.           Sincerely,            Ronald Perez, DO

## 2024-12-04 NOTE — PROGRESS NOTES
1. Osteochondral defect of ankle        2. Primary osteoarthritis of right knee  Ambulatory referral to Physical Therapy      3. Chronic pain of both knees  XR knee 3 vw right non injury    XR knee 3 vw left non injury        Orders Placed This Encounter   Procedures    XR knee 3 vw right non injury    XR knee 3 vw left non injury    Ambulatory referral to Physical Therapy        Impression:  Patient is here in follow up of right knee pain likely secondary to osteoarthritis. She also presents with left knee pain due to OA as below.   Treatment has included IA right knee steroid injection.  Today we decided to proceed with formal physical therapy, physical activity/weight loss.  Can consider intra-articular steroid injection versus HA injections in the future.    Right ankle pain likely 2/2 to osteoarthritis and osteochondral defect.  She was referred to ankle-foot surgeon two years ago but did not make it.  I discussed this with her again today. Can lead to early OA.     Continue work restrictions of no restraining due to chronic knee pain.     Imaging Studies (I personally reviewed images in PACS and report):  Right knee x-rays show mild-moderate tricompartmental osteoarthritis abdominal flex the medial joint space.  Daniela-Stieda lesion is noted.  There is trace joint effusion.  Enthesopathic changes at the patella.    Left knee x-rays show mild osteoarthritic changes that affect the medial joint space.  There is slight lateral tracking of the patella.  There is a small joint effusion.     Right ankle x-rays obtained on 3/24/2022.  These images show likely osteochondral defect at the medial talar dome.  There are chronic appearing ossicles at the medial malleolus.  There are enthesopathic changes in the calcaneus.     Right ankle MRI reviewed. There is an osteochondral defect of the medial talar dome which appears to be chronic with cystic changes.    Return in about 6 weeks (around 1/15/2025).    Patient is  "in agreement with the above plan.    HPI:  Elizabeth Fuentes is a 54 y.o. female  who presents in follow up.  Here for   Chief Complaint   Patient presents with    Right Knee - Pain, Follow-up    Left Knee - Pain       Since last visit: See above.    Following history reviewed and updated:  Past Medical History:   Diagnosis Date    Anemia     Anxiety     Arthritis     Hypertension 1/1/24     Past Surgical History:   Procedure Laterality Date    NO PAST SURGERIES       Social History   Social History     Substance and Sexual Activity   Alcohol Use Not Currently    Alcohol/week: 1.0 standard drink of alcohol    Types: 1 Glasses of wine per week    Comment: Every 3 weeks     Social History     Substance and Sexual Activity   Drug Use Never     Social History     Tobacco Use   Smoking Status Never   Smokeless Tobacco Never     Family History   Problem Relation Age of Onset    Diabetes Mother     Hypertension Mother     Rheum arthritis Mother     Alcohol abuse Father     Diabetes Father     Anemia Sister     Anxiety disorder Sister      Allergies   Allergen Reactions    Peanut (Diagnostic) - Food Allergy Hives, Shortness Of Breath and Throat Swelling    Cat Hair Extract Allergic Rhinitis    Shrimp (Diagnostic) - Food Allergy Swelling        Constitutional:  /84   Pulse 78   Ht 5' 3\" (1.6 m)   Wt 124 kg (273 lb)   LMP  (LMP Unknown)   BMI 48.36 kg/m²    General: NAD.  Eyes: Clear sclerae.  ENT: No inflammation, lesion, or mass of lips.  No tracheal deviation.  Musculoskeletal: As mentioned below.  Integumentary: No visible rashes or skin lesions.  Pulmonary/Chest: Effort normal. No respiratory distress.   Neuro: CN's grossly intact, SHULTZ.  Psych: Normal affect and judgement.  Vascular: WWP.    Right Knee Exam     Tenderness   The patient is experiencing tenderness in the medial joint line.    Range of Motion   Extension:  normal     Other   Erythema: absent  Scars: absent  Sensation: normal  Pulse: " present  Swelling: none  Effusion: no effusion present      Left Knee Exam     Tenderness   The patient is experiencing tenderness in the medial joint line.    Range of Motion   Extension:  normal     Other   Erythema: absent  Scars: absent  Sensation: normal  Pulse: present  Swelling: none  Effusion: no effusion present             Procedures

## 2024-12-04 NOTE — PATIENT INSTRUCTIONS
You can obtain diclofenac cream/gel/ointment over the counter.      Many brands make this medication and they include Voltaren, Aspercreme and Aleve.    You can use this up to 3 times per day.  It is best to wash the area with water, pat dry and then apply.  The cream absorbs better after this.    Be careful not to let any pets or children get in contact with the medication as it can be harmful to them.    If you develop a skin reaction, stop using the cream.     Rules for limiting activity by pain symptoms:      -You can work through some pain, but keep it at a level from which you are distractible. Pain should not exceed 3/10 in severity.   -Do not change your biomechanics / form with your activity.  This can lead to further injury.   -If you pay for your activity later with substantial symptom exacerbation, you are not yet ready for that level of exertion.

## 2025-02-25 ENCOUNTER — RA CDI HCC (OUTPATIENT)
Dept: OTHER | Facility: HOSPITAL | Age: 55
End: 2025-02-25

## 2025-02-26 ENCOUNTER — OFFICE VISIT (OUTPATIENT)
Age: 55
End: 2025-02-26
Payer: COMMERCIAL

## 2025-02-26 VITALS
TEMPERATURE: 98.5 F | HEART RATE: 84 BPM | SYSTOLIC BLOOD PRESSURE: 130 MMHG | OXYGEN SATURATION: 98 % | DIASTOLIC BLOOD PRESSURE: 78 MMHG

## 2025-02-26 DIAGNOSIS — I10 HTN (HYPERTENSION), BENIGN: ICD-10-CM

## 2025-02-26 DIAGNOSIS — E79.0 HYPERURICEMIA W/O SIGNS OF INFLAM ARTHRIT AND TOPHACEOUS DIS: ICD-10-CM

## 2025-02-26 DIAGNOSIS — E55.9 VITAMIN D DEFICIENCY: ICD-10-CM

## 2025-02-26 DIAGNOSIS — E11.9 TYPE 2 DIABETES MELLITUS WITHOUT COMPLICATION, WITHOUT LONG-TERM CURRENT USE OF INSULIN (HCC): Primary | ICD-10-CM

## 2025-02-26 PROBLEM — E66.01 MORBID OBESITY WITH BMI OF 45.0-49.9, ADULT (HCC): Status: ACTIVE | Noted: 2025-02-26

## 2025-02-26 LAB — SL AMB POCT HEMOGLOBIN AIC: 6.6 (ref ?–6.5)

## 2025-02-26 PROCEDURE — 99214 OFFICE O/P EST MOD 30 MIN: CPT | Performed by: INTERNAL MEDICINE

## 2025-02-26 PROCEDURE — 83036 HEMOGLOBIN GLYCOSYLATED A1C: CPT | Performed by: INTERNAL MEDICINE

## 2025-02-26 RX ORDER — TIRZEPATIDE 2.5 MG/.5ML
2.5 INJECTION, SOLUTION SUBCUTANEOUS WEEKLY
Qty: 2 ML | Refills: 2 | Status: SHIPPED | OUTPATIENT
Start: 2025-02-26

## 2025-02-26 RX ORDER — ALLOPURINOL 100 MG/1
100 TABLET ORAL DAILY PRN
COMMUNITY

## 2025-02-26 RX ORDER — ERGOCALCIFEROL 1.25 MG/1
50000 CAPSULE, LIQUID FILLED ORAL WEEKLY
Qty: 12 CAPSULE | Refills: 1 | Status: SHIPPED | OUTPATIENT
Start: 2025-02-26

## 2025-02-26 RX ORDER — BUSPIRONE HYDROCHLORIDE 10 MG/1
TABLET ORAL
COMMUNITY
Start: 2025-01-30

## 2025-02-26 NOTE — ASSESSMENT & PLAN NOTE
Lab Results   Component Value Date    HGBA1C 6.8 (H) 02/06/2024       Orders:    Tirzepatide (Mounjaro) 2.5 MG/0.5ML SOAJ; Inject 2.5 mg under the skin once a week    CBC and differential; Future    Comprehensive metabolic panel; Future    Hemoglobin A1C; Future    Lipid Panel with Direct LDL reflex; Future    UA w Reflex to Microscopic w Reflex to Culture; Future    Albumin / creatinine urine ratio; Future

## 2025-02-26 NOTE — PROGRESS NOTES
Name: Elizabeth Fuentes      : 1970      MRN: [id number]  Encounter Provider: Zofia Maloney MD  Encounter Date: 2025   Encounter department: [facility] [place]    Assessment & Plan  Vitamin D deficiency    Orders:    ergocalciferol (VITAMIN D2) 50,000 units; Take 1 capsule (50,000 Units total) by mouth once a week    Type 2 diabetes mellitus without complication, without long-term current use of insulin ([facility])    Lab Results   Component Value Date    HGBA1C 6.8 (H) 2024       Orders:    Tirzepatide (Mounjaro) 2.5 MG/0.5ML SOAJ; Inject 2.5 mg under the skin once a week    CBC and differential; Future    Comprehensive metabolic panel; Future    Hemoglobin A1C; Future    Lipid Panel with Direct LDL reflex; Future    UA w Reflex to Microscopic w Reflex to Culture; Future    Albumin / creatinine urine ratio; Future    Hyperuricemia w/o signs of inflam arthrit and tophaceous dis    Orders:    Uric acid; Future    HTN (hypertension), benign    Orders:    CBC and differential; Future    Comprehensive metabolic panel; Future    UA w Reflex to Microscopic w Reflex to Culture; Future      Assessment & Plan  1. Weight management.  Her BMI is elevated, necessitating a reduction. The goal is to achieve a weight of 220 pounds, which was her lowest adult weight. She has expressed interest in trying Mounjaro, which her sister is currently using. She has been advised to maintain a regular bowel regimen and ensure adequate hydration. She has been informed that the medication may take several weeks to exhibit its full effects. She has been advised to discontinue the medication if she experiences any adverse effects. A prescription for Mounjaro 2.5 mg weekly for a month has been provided, with instructions to increase the dosage to 5 mg weekly if tolerated. She has been instructed to complete her blood work prior to initiating the medication. A Cologuard test has been ordered. An  ultrasound has been ordered to evaluate for fibroids. A mammogram has been ordered. A comprehensive panel of blood tests, including CBC, CMP, and lipid profile, has been ordered. A urine test has also been ordered.    2. Muscle cramps.  She reports experiencing severe muscle cramps in her legs and lower abdomen. Circulation in her legs was assessed and found to be weak. She has been advised to monitor her symptoms and report any changes.    3. Hypertension.  She is currently taking amlodipine for hypertension. She reports frequent urination, which may be related to her blood sugar levels rather than her antihypertensive medication. She has been advised to continue her current medication regimen and monitor her blood pressure. A urine test has been ordered to further investigate the cause of her symptoms.    Follow-up  The patient will follow up in 3 months for a physical examination.   I have spent more than 45 minutes with Patient  today in which greater than 50% of this time was spent in detail history and examination the patient and coordination of care regarding Diagnostic results, Prognosis, Risks and benefits of tx options, Intructions for management, Patient and family education, Importance of tx compliance, Risk factor reductions and Impressions.   Chief Complaint   Patient presents with    Follow-up     5 m f/u visit for diabetes,  she did not go for labs.  She c/o having severe muscle cramping all over her body over the past 2 years and is gradually getting more frequent.  Her sister was just diagnosed with MGUS at age 55.        Health Maintenance     She had a diabetic eye exam done at [facility] in [place] June 2024.  She will schedule a mammogram.  She declines all CRC testing.      History of Present Illness     History of Present Illness  The patient presents for evaluation of weight management, muscle cramps, and hypertension.    She has expressed concerns regarding the potential impact of  semaglutide on her bowel movements, fearing it may induce constipation. She is currently not on metformin due to perceived side effects. Her bowel movements are regular, occurring once or twice daily, and she reports no issues with clearance. She has a family history of colon cancer and has not undergone a colonoscopy or Cologuard test. She has a history of fibroids and has been ordered an ultrasound by Dr. Ramirez but has not yet completed it. She has an appointment with Dr. Ramirez on 03/04/2025. She is currently in menopause and previously experienced heavy menstrual bleeding. She does not report any thyroid issues.    She experiences severe spasmodic cramps in her legs and lower abdominal muscles, which have been increasing in frequency. She has not had her circulation studied. She does not experience coldness in her toes during winter.    She is currently on amlodipine for hypertension, which she takes at night. She has been experiencing increased urinary frequency, particularly at night, and is seeking clarification on whether this could be a side effect of her blood pressure medication.    Supplemental Information  She takes allopurinol when she has too many periods because she feels it in her ankle and addresses it immediately. She does not take it regularly because she does not need it regularly. She takes Wellbutrin at night. She only took BuSpar for 2 days because she had a little bit of anxiety. She still has plenty in the bottle. She does not take venlafaxine or iron supplements.    FAMILY HISTORY  Her sister was diagnosed with MGUS.    MEDICATIONS  Current: allopurinol, amlodipine, Wellbutrin, BuSpar (as needed), vitamin D  Discontinued: metformin  Review of Systems   Genitourinary:         History of fibroid   Musculoskeletal:  Positive for arthralgias.        Leg pain   Psychiatric/Behavioral:  The patient is nervous/anxious.    All other systems reviewed and are negative.    Past Medical History:    Diagnosis Date    Anemia     Anxiety     Arthritis     Hypertension 1/1/24     Past Surgical History:   Procedure Laterality Date    NO PAST SURGERIES       Family History   Problem Relation Age of Onset    Diabetes Mother     Hypertension Mother     Rheum arthritis Mother     Alcohol abuse Father     Diabetes Father     Anemia Sister     Anxiety disorder Sister         MGUS - age 55     Social History     Tobacco Use    Smoking status: Never    Smokeless tobacco: Never   Vaping Use    Vaping status: Never Used   Substance and Sexual Activity    Alcohol use: Not Currently     Alcohol/week: 1.0 standard drink of alcohol     Types: 1 Glasses of wine per week     Comment: Every 3 weeks    Drug use: Never    Sexual activity: Yes     Partners: Male     Birth control/protection: Post-menopausal     Current Outpatient Medications on File Prior to Visit   Medication Sig    allopurinol (ZYLOPRIM) 100 mg tablet Take 100 mg by mouth daily as needed    amLODIPine (NORVASC) 5 mg tablet Take 1 tablet (5 mg total) by mouth daily    buPROPion (WELLBUTRIN XL) 300 mg 24 hr tablet Take 300 mg by mouth every morning    Sodium Fluoride 5000 PPM 1.1 % PSTE Use as directed    [DISCONTINUED] allopurinol (ZYLOPRIM) 100 mg tablet Take 1 tablet (100 mg total) by mouth daily    [DISCONTINUED] ergocalciferol (VITAMIN D2) 50,000 units Take 1 capsule (50,000 Units total) by mouth once a week    busPIRone (BUSPAR) 10 mg tablet TAKE 1 TABLET BY MOUTH EVERY 24 HOURS FOR 30 DAYS    metFORMIN (GLUCOPHAGE) 500 mg tablet TAKE 0.5 TABLETS BY MOUTH 2 TIMES A DAY WITH MEALS. (Patient not taking: Reported on 2/26/2025)    triamterene-hydrochlorothiazide (DYAZIDE) 37.5-25 mg per capsule Take 1 capsule by mouth daily    venlafaxine (EFFEXOR-XR) 37.5 mg 24 hr capsule Take 37.5 mg by mouth daily    [DISCONTINUED] atoMOXetine (STRATTERA) 25 mg capsule Take 25 mg by mouth daily (Patient not taking: Reported on 12/4/2024)    [DISCONTINUED] ferrous sulfate 325  (65 FE) MG EC tablet Take 325 mg by mouth daily with breakfast (Patient not taking: Reported on 12/4/2024)     Allergies   Allergen Reactions    Peanut (Diagnostic) - Food Allergy Hives, Shortness Of Breath and Throat Swelling    Cat Dander Allergic Rhinitis    Shrimp (Diagnostic) - Food Allergy Swelling     Immunization History   Administered Date(s) Administered    COVID-19 PFIZER VACCINE 0.3 ML IM 12/16/2021, 12/16/2021    Influenza, injectable, quadrivalent, preservative free 0.5 mL 01/30/2024     Objective   /78 (BP Location: Left arm, Patient Position: Sitting, Cuff Size: Large)   Pulse 84   Temp 98.5 °F (36.9 °C) (Temporal)   LMP  (LMP Unknown)   SpO2 98%     Physical Exam    Physical Exam    Gen: healthy appearing, overweight  Heent: atraumatic, normocephalic, sclera is clear and PERRLA  Mouth: is moist  Neck: is supple, no JVD, no carotid bruits.   Heart: is regular Normal s1, s2, no murmurs, rubs, clicks or gallops.   Lungs: are clear to auscultation and percussion.  Abd: soft, nontender,Ext: no edema, distal pulses normal  Skin: no rashes, ulcers  Mood: normal affect  Neuro: AAOx3    I have spent 45 minutes with Patient  today in which greater than 50% of this time was spent in counseling/coordination of care regarding Diagnostic results, Prognosis, Risks and benefits of tx options, Intructions for management, Patient and family education, Importance of tx compliance, Risk factor reductions and Impressions.

## 2025-02-26 NOTE — ASSESSMENT & PLAN NOTE
Orders:    CBC and differential; Future    Comprehensive metabolic panel; Future    UA w Reflex to Microscopic w Reflex to Culture; Future

## 2025-02-27 ENCOUNTER — TELEPHONE (OUTPATIENT)
Dept: ADMINISTRATIVE | Facility: OTHER | Age: 55
End: 2025-02-27

## 2025-02-27 ENCOUNTER — TELEPHONE (OUTPATIENT)
Dept: INTERNAL MEDICINE CLINIC | Facility: OTHER | Age: 55
End: 2025-02-27

## 2025-02-27 NOTE — TELEPHONE ENCOUNTER
----- Message from Marley GONZALEZ sent at 2/26/2025  3:43 PM EST -----  Regarding: diabetic eye exam - [facility]  02/26/25 3:43 PM    Hello, our patient Elizabeth Fuentes has had Diabetic Eye Exam completed/performed. Please assist in updating the patient chart by making an External outreach to [facility] facility located in [place]. The date of service is 2024.    Thank you,  Marley Baron MA  [facility] PRIMARY CARE [place]

## 2025-02-27 NOTE — LETTER
Diabetic Eye Exam Form    Date Requested: 25  Patient: Elizabeth Fuentes  Patient : 1970   Referring Provider: Zofia Maloney MD      DIABETIC Eye Exam Date _______________________________      Type of Exam MUST be documented for Diabetic Eye Exams. Please CHECK ONE.     Retinal Exam       Dilated Retinal Exam       OCT       Optomap-Iris Exam      Fundus Photography       Left Eye - Please check Retinopathy or No Retinopathy        Exam did show retinopathy    Exam did not show retinopathy       Right Eye - Please check Retinopathy or No Retinopathy       Exam did show retinopathy    Exam did not show retinopathy       Comments __________________________________________________________    Practice Providing Exam ______________________________________________    Exam Performed By (print name) _______________________________________      Provider Signature ___________________________________________________      These reports are needed for  compliance.  Please fax this completed form and a copy of the Diabetic Eye Exam report to our office located at [address] [place] [address] as soon as possible via Fax [phone] attention Joanne: Phone [phone]  We thank you for your assistance in treating our mutual patient.

## 2025-02-27 NOTE — LETTER
Diabetic Eye Exam Form    Date Requested: 25  Patient: Elizabeth Fuentes  Patient : 1970   Referring Provider: Zofia Maloney MD      DIABETIC Eye Exam Date _______________________________      Type of Exam MUST be documented for Diabetic Eye Exams. Please CHECK ONE.     Retinal Exam       Dilated Retinal Exam       OCT       Optomap-Iris Exam      Fundus Photography       Left Eye - Please check Retinopathy or No Retinopathy        Exam did show retinopathy    Exam did not show retinopathy       Right Eye - Please check Retinopathy or No Retinopathy       Exam did show retinopathy    Exam did not show retinopathy       Comments __________________________________________________________    Practice Providing Exam ______________________________________________    Exam Performed By (print name) _______________________________________      Provider Signature ___________________________________________________      These reports are needed for  compliance.  Please fax this completed form and a copy of the Diabetic Eye Exam report to our office located at [address] [place] [address] as soon as possible via Fax [phone] attention Joanne: Phone [phone]  We thank you for your assistance in treating our mutual patient.

## 2025-02-27 NOTE — TELEPHONE ENCOUNTER
Received medication prior authorization request from [facility] for the following medication:    Tirzepatide (Mounjaro) 2.5 MG/0.5ML SOAJ [[id number]]    Order Details  Dose: 2.5 mg Route: Subcutaneous Frequency: Weekly   Dispense Quantity: 2 mL Refills: 2    Indications of Use: Type 2 Diabetes Mellitus         Sig: Inject 2.5 mg under the skin once a week         Start Date: 02/26/25 End Date: --   Written Date: 02/26/25 Expiration Date: 02/26/26       Associated Diagnoses: Type 2 diabetes mellitus without complication, without long-term current use of insulin ([facility]) [E11.9]     Form has been scanned into patients chart.

## 2025-02-28 NOTE — TELEPHONE ENCOUNTER
PA for (Mounjaro) 2.5 MG/0.5ML SOAJ SUBMITTED to [employer]    via    []CMM-KEY:   [x][employer]-Case ID # [id number]   []Availity-Auth ID # NDC #   []Faxed to plan   []Other website   []Phone call Case ID #     [x]PA sent as URGENT    All office notes, labs and other pertaining documents and studies sent. Clinical questions answered. Awaiting determination from insurance company.     Turnaround time for your insurance to make a decision on your Prior Authorization can take 7-21 business days.

## 2025-02-28 NOTE — TELEPHONE ENCOUNTER
Upon review of the In Basket request and the patient's chart, initial outreach has been made via fax to facility. Please see Contacts section for details.     Thank you  Joanne Christianson

## 2025-02-28 NOTE — TELEPHONE ENCOUNTER
PA for (Mounjaro) 2.5 MG/0.5ML SOAJ APPROVED     Date(s) approved 01/29/25-02/28/26    Case # [id number]    Patient advised by          []MyChart Message  []Phone call   [x]LMOM  []L/M to call office as no active Communication consent on file  []Unable to leave detailed message as VM not approved on Communication consent       Pharmacy advised by    [x]Fax  []Phone call       Approval letter scanned into [employer], not available at time of approval

## 2025-03-03 NOTE — TELEPHONE ENCOUNTER
Upon review of the In Basket request we were able to locate, review, and update the patient chart as requested for Diabetic Eye Exam.    Any additional questions or concerns should be emailed to the Practice Liaisons via the appropriate education email address, please do not reply via In Basket.    Thank you  Joanne Christianson   [facility]

## 2025-03-04 ENCOUNTER — APPOINTMENT (OUTPATIENT)
Dept: LAB | Facility: MEDICAL CENTER | Age: 55
End: 2025-03-04
Payer: COMMERCIAL

## 2025-03-04 ENCOUNTER — ANNUAL EXAM (OUTPATIENT)
Dept: OBGYN CLINIC | Facility: CLINIC | Age: 55
End: 2025-03-04
Payer: COMMERCIAL

## 2025-03-04 VITALS
WEIGHT: 273 LBS | HEIGHT: 63 IN | BODY MASS INDEX: 48.37 KG/M2 | SYSTOLIC BLOOD PRESSURE: 136 MMHG | DIASTOLIC BLOOD PRESSURE: 72 MMHG

## 2025-03-04 DIAGNOSIS — R87.89 HIGH RISK HUMAN PAPILLOMAVIRUS (HPV) DNA TEST POSITIVE: ICD-10-CM

## 2025-03-04 DIAGNOSIS — E79.0 HYPERURICEMIA W/O SIGNS OF INFLAM ARTHRIT AND TOPHACEOUS DIS: ICD-10-CM

## 2025-03-04 DIAGNOSIS — E11.9 TYPE 2 DIABETES MELLITUS WITHOUT COMPLICATION, WITHOUT LONG-TERM CURRENT USE OF INSULIN (HCC): ICD-10-CM

## 2025-03-04 DIAGNOSIS — I10 HTN (HYPERTENSION), BENIGN: ICD-10-CM

## 2025-03-04 DIAGNOSIS — Z01.419 ENCNTR FOR GYN EXAM (GENERAL) (ROUTINE) W/O ABN FINDINGS: Primary | ICD-10-CM

## 2025-03-04 LAB
ALBUMIN SERPL BCG-MCNC: 4.2 G/DL (ref 3.5–5)
ALP SERPL-CCNC: 77 U/L (ref 34–104)
ALT SERPL W P-5'-P-CCNC: 18 U/L (ref 7–52)
AMORPH URATE CRY URNS QL MICRO: ABNORMAL
ANION GAP SERPL CALCULATED.3IONS-SCNC: 10 MMOL/L (ref 4–13)
AST SERPL W P-5'-P-CCNC: 20 U/L (ref 13–39)
BACTERIA UR QL AUTO: ABNORMAL /HPF
BASOPHILS # BLD AUTO: 0.04 THOUSANDS/ÂΜL (ref 0–0.1)
BASOPHILS NFR BLD AUTO: 1 % (ref 0–1)
BILIRUB SERPL-MCNC: 0.28 MG/DL (ref 0.2–1)
BILIRUB UR QL STRIP: NEGATIVE
BUN SERPL-MCNC: 14 MG/DL (ref 5–25)
CALCIUM SERPL-MCNC: 9.4 MG/DL (ref 8.4–10.2)
CHLORIDE SERPL-SCNC: 102 MMOL/L (ref 96–108)
CHOLEST SERPL-MCNC: 194 MG/DL (ref ?–200)
CLARITY UR: CLEAR
CO2 SERPL-SCNC: 28 MMOL/L (ref 21–32)
COLOR UR: ABNORMAL
CREAT SERPL-MCNC: 1.13 MG/DL (ref 0.6–1.3)
CREAT UR-MCNC: 181.1 MG/DL
EOSINOPHIL # BLD AUTO: 0.08 THOUSAND/ÂΜL (ref 0–0.61)
EOSINOPHIL NFR BLD AUTO: 2 % (ref 0–6)
ERYTHROCYTE [DISTWIDTH] IN BLOOD BY AUTOMATED COUNT: 15.2 % (ref 11.6–15.1)
EST. AVERAGE GLUCOSE BLD GHB EST-MCNC: 146 MG/DL
GFR SERPL CREATININE-BSD FRML MDRD: 54 ML/MIN/1.73SQ M
GLUCOSE P FAST SERPL-MCNC: 111 MG/DL (ref 65–99)
GLUCOSE UR STRIP-MCNC: NEGATIVE MG/DL
HBA1C MFR BLD: 6.7 %
HCT VFR BLD AUTO: 43.1 % (ref 34.8–46.1)
HDLC SERPL-MCNC: 48 MG/DL
HGB BLD-MCNC: 13.1 G/DL (ref 11.5–15.4)
HGB UR QL STRIP.AUTO: NEGATIVE
IMM GRANULOCYTES # BLD AUTO: 0.01 THOUSAND/UL (ref 0–0.2)
IMM GRANULOCYTES NFR BLD AUTO: 0 % (ref 0–2)
KETONES UR STRIP-MCNC: NEGATIVE MG/DL
LDLC SERPL CALC-MCNC: 121 MG/DL (ref 0–100)
LEUKOCYTE ESTERASE UR QL STRIP: NEGATIVE
LYMPHOCYTES # BLD AUTO: 2.99 THOUSANDS/ÂΜL (ref 0.6–4.47)
LYMPHOCYTES NFR BLD AUTO: 55 % (ref 14–44)
MCH RBC QN AUTO: 24.9 PG (ref 26.8–34.3)
MCHC RBC AUTO-ENTMCNC: 30.4 G/DL (ref 31.4–37.4)
MCV RBC AUTO: 82 FL (ref 82–98)
MICROALBUMIN UR-MCNC: 17.5 MG/L
MICROALBUMIN/CREAT 24H UR: 10 MG/G CREATININE (ref 0–30)
MONOCYTES # BLD AUTO: 0.58 THOUSAND/ÂΜL (ref 0.17–1.22)
MONOCYTES NFR BLD AUTO: 11 % (ref 4–12)
NEUTROPHILS # BLD AUTO: 1.64 THOUSANDS/ÂΜL (ref 1.85–7.62)
NEUTS SEG NFR BLD AUTO: 31 % (ref 43–75)
NITRITE UR QL STRIP: NEGATIVE
NON-SQ EPI CELLS URNS QL MICRO: ABNORMAL /HPF
NRBC BLD AUTO-RTO: 0 /100 WBCS
PH UR STRIP.AUTO: 6 [PH]
PLATELET # BLD AUTO: 237 THOUSANDS/UL (ref 149–390)
PMV BLD AUTO: 10.6 FL (ref 8.9–12.7)
POTASSIUM SERPL-SCNC: 4.3 MMOL/L (ref 3.5–5.3)
PROT SERPL-MCNC: 8.2 G/DL (ref 6.4–8.4)
PROT UR STRIP-MCNC: ABNORMAL MG/DL
RBC # BLD AUTO: 5.26 MILLION/UL (ref 3.81–5.12)
RBC #/AREA URNS AUTO: ABNORMAL /HPF
SODIUM SERPL-SCNC: 140 MMOL/L (ref 135–147)
SP GR UR STRIP.AUTO: 1.02 (ref 1–1.03)
TRIGL SERPL-MCNC: 123 MG/DL (ref ?–150)
URATE SERPL-MCNC: 7.9 MG/DL (ref 2–7.5)
UROBILINOGEN UR STRIP-ACNC: <2 MG/DL
WBC # BLD AUTO: 5.34 THOUSAND/UL (ref 4.31–10.16)
WBC #/AREA URNS AUTO: ABNORMAL /HPF

## 2025-03-04 PROCEDURE — 85025 COMPLETE CBC W/AUTO DIFF WBC: CPT

## 2025-03-04 PROCEDURE — 80061 LIPID PANEL: CPT

## 2025-03-04 PROCEDURE — G0476 HPV COMBO ASSAY CA SCREEN: HCPCS | Performed by: OBSTETRICS & GYNECOLOGY

## 2025-03-04 PROCEDURE — 83036 HEMOGLOBIN GLYCOSYLATED A1C: CPT

## 2025-03-04 PROCEDURE — 36415 COLL VENOUS BLD VENIPUNCTURE: CPT

## 2025-03-04 PROCEDURE — G0143 SCR C/V CYTO,THINLAYER,RESCR: HCPCS | Performed by: OBSTETRICS & GYNECOLOGY

## 2025-03-04 PROCEDURE — 80053 COMPREHEN METABOLIC PANEL: CPT

## 2025-03-04 PROCEDURE — S0612 ANNUAL GYNECOLOGICAL EXAMINA: HCPCS | Performed by: OBSTETRICS & GYNECOLOGY

## 2025-03-04 PROCEDURE — 84550 ASSAY OF BLOOD/URIC ACID: CPT

## 2025-03-04 PROCEDURE — 81001 URINALYSIS AUTO W/SCOPE: CPT

## 2025-03-04 PROCEDURE — 82043 UR ALBUMIN QUANTITATIVE: CPT

## 2025-03-04 PROCEDURE — 82570 ASSAY OF URINE CREATININE: CPT

## 2025-03-04 NOTE — PROGRESS NOTES
Elizabeth Fuentes   1970    CC:  Yearly exam    S:  55 y.o. female here for yearly exam. She is postmenopausal and has had no vaginal bleeding.  She denies vaginal discharge, itching, odor or dryness.     She was recently told that her A1C was up, so she is on Monjauro and is excited about some weight loss from that.  She never did her colon cancer screening but is aware it is overdue.      Sexual activity: She is sexually active without pain, bleeding or dryness.     Last Pap: 2/29/2024 - normal/ positive HPV (other)  Last Mammo: 7/12/2022 - BIRAD-1  Last Colonoscopy: never  Last DEXA: never    We reviewed ASCCP guidelines for Pap testing.       Current Outpatient Medications:     allopurinol (ZYLOPRIM) 100 mg tablet, Take 100 mg by mouth daily as needed, Disp: , Rfl:     amLODIPine (NORVASC) 5 mg tablet, Take 1 tablet (5 mg total) by mouth daily, Disp: , Rfl:     buPROPion (WELLBUTRIN XL) 300 mg 24 hr tablet, Take 300 mg by mouth every morning, Disp: , Rfl:     busPIRone (BUSPAR) 10 mg tablet, TAKE 1 TABLET BY MOUTH EVERY 24 HOURS FOR 30 DAYS, Disp: , Rfl:     ergocalciferol (VITAMIN D2) 50,000 units, Take 1 capsule (50,000 Units total) by mouth once a week, Disp: 12 capsule, Rfl: 1    Sodium Fluoride 5000 PPM 1.1 % PSTE, Use as directed, Disp: , Rfl:     Tirzepatide (Mounjaro) 2.5 MG/0.5ML SOAJ, Inject 2.5 mg under the skin once a week, Disp: 2 mL, Rfl: 2  Social History     Socioeconomic History    Marital status: Single     Spouse name: Not on file    Number of children: Not on file    Years of education: Not on file    Highest education level: Not on file   Occupational History    Not on file   Tobacco Use    Smoking status: Never    Smokeless tobacco: Never   Vaping Use    Vaping status: Never Used   Substance and Sexual Activity    Alcohol use: Not Currently     Alcohol/week: 1.0 standard drink of alcohol     Types: 1 Glasses of wine per week     Comment: Every 3 weeks    Drug use: Never    Sexual  "activity: Yes     Partners: Male     Birth control/protection: Post-menopausal   Other Topics Concern    Not on file   Social History Narrative    Not on file     Social Drivers of Health     Financial Resource Strain: Not on file   Food Insecurity: Not on file   Transportation Needs: Not on file   Physical Activity: Not on file   Stress: Not on file   Social Connections: Not on file   Intimate Partner Violence: Not on file   Housing Stability: Not on file     Family History   Problem Relation Age of Onset    Diabetes Mother     Hypertension Mother     Rheum arthritis Mother     Alcohol abuse Father     Diabetes Father     Anemia Sister     Anxiety disorder Sister         MGUS - age 55     Past Medical History:   Diagnosis Date    Anemia     Anxiety     Arthritis     Hypertension 1/1/24        Review of Systems   Respiratory: Negative.    Cardiovascular: Negative.    Gastrointestinal: Negative for constipation and diarrhea.   Genitourinary: Negative for difficulty urinating, pelvic pain, vaginal bleeding, vaginal discharge, itching or odor.    O:  Blood pressure 136/72, height 5' 3\" (1.6 m), weight 124 kg (273 lb).    Patient appears well and is not in distress  Neck is supple without masses  Breasts are symmetrical without mass, tenderness, nipple discharge, skin changes or adenopathy.   Abdomen is soft and nontender without masses.   External genitals are normal without lesions or rashes.  Urethral meatus and urethra are normal  Bladder is normal to palpation  Vagina is normal without discharge or bleeding.   Cervix is normal without discharge or lesion.   Uterus is normal, mobile, nontender without palpable mass.  Exam limited by body habitus.   Adnexa are normal, nontender, without palpable mass. Exam limited by body habitus.     A:   Yearly exam.     P:   Pap with HPV done - will call with results; aware of possible need for colposcopy  Mammo ordered by PCP   Colon cancer screening due - Cologuard ordered by " PCP   DEXA due age 63    RTO one year for yearly exam or sooner as needed.

## 2025-03-05 LAB
HPV HR 12 DNA CVX QL NAA+PROBE: NEGATIVE
HPV16 DNA CVX QL NAA+PROBE: NEGATIVE
HPV18 DNA CVX QL NAA+PROBE: NEGATIVE

## 2025-03-10 LAB
LAB AP GYN PRIMARY INTERPRETATION: NORMAL
Lab: NORMAL

## 2025-03-11 ENCOUNTER — RESULTS FOLLOW-UP (OUTPATIENT)
Dept: OBGYN CLINIC | Facility: CLINIC | Age: 55
End: 2025-03-11

## 2025-03-25 ENCOUNTER — TELEPHONE (OUTPATIENT)
Age: 55
End: 2025-03-25

## 2025-03-25 DIAGNOSIS — E11.9 TYPE 2 DIABETES MELLITUS WITHOUT COMPLICATION, WITHOUT LONG-TERM CURRENT USE OF INSULIN (HCC): ICD-10-CM

## 2025-03-25 RX ORDER — TIRZEPATIDE 5 MG/.5ML
5 INJECTION, SOLUTION SUBCUTANEOUS WEEKLY
Qty: 2 ML | Refills: 1 | Status: SHIPPED | OUTPATIENT
Start: 2025-03-25

## 2025-03-25 NOTE — TELEPHONE ENCOUNTER
I have increased the dose to the strength up of the Mounjaro.  Please be careful with the amount in the volume of food you eat because the dose of the medication is higher and I do not want you to side effects and start throwing up.

## 2025-03-25 NOTE — TELEPHONE ENCOUNTER
Patient called the RX Refill Line. Message is being forwarded to the office.     Patient is requesting a refill on her mounjaro 2.5 mg. Patient was also wondering if she has to up her dose because she feeling very hungry on her 6th day.    Please contact patient at [phone]

## 2025-04-29 DIAGNOSIS — I10 HTN (HYPERTENSION), BENIGN: ICD-10-CM

## 2025-04-29 DIAGNOSIS — E11.9 TYPE 2 DIABETES MELLITUS WITHOUT COMPLICATION, WITHOUT LONG-TERM CURRENT USE OF INSULIN (HCC): ICD-10-CM

## 2025-04-29 RX ORDER — TIRZEPATIDE 5 MG/.5ML
5 INJECTION, SOLUTION SUBCUTANEOUS WEEKLY
Qty: 2 ML | Refills: 0 | Status: SHIPPED | OUTPATIENT
Start: 2025-04-29

## 2025-04-29 RX ORDER — AMLODIPINE BESYLATE 5 MG/1
5 TABLET ORAL DAILY
Qty: 90 TABLET | Refills: 1 | Status: SHIPPED | OUTPATIENT
Start: 2025-04-29

## 2025-04-29 NOTE — TELEPHONE ENCOUNTER
Reason for call:   [x] Refill   [] Prior Auth  [x] Other: Last injection was 4/23.    Office:   [x] PCP/Provider -  Zofia Maloney MD   [] Specialty/Provider -     Medication:  Tirzepatide (Mounjaro) 5 MG/0.5ML SOAJ    Dose/Frequency:  Inject 5 mg under the skin once a week     Quantity: 2 ml    Pharmacy: [facility]/pharmacy #[id number] [facility] [place] [facility] [address] [facility]   Does the patient have enough for 3 days?   [] Yes   [x] No - Send as HP to POD    Mail Away Pharmacy   Does the patient have enough for 10 days?   [] Yes   [] No - Send as HP to POD    Reason for call:   [x] Refill   [] Prior Auth  [] Other:     Office:   [x] PCP/Provider -  Zofia Maloney MD   [] Specialty/Provider -     Medication: amLODIPine (NORVASC) 5 mg tablet     Dose/Frequency:  Take 1 tablet (5 mg total) by mouth daily,     Quantity: 30    Pharmacy: [facility]/pharmacy #[id number] [address] [facility]   Does the patient have enough for 3 days?   [x] Yes   [] No - Send as HP to POD    Mail Away Pharmacy   Does the patient have enough for 10 days?   [] Yes   [] No - Send as HP to POD

## 2025-05-01 ENCOUNTER — TELEPHONE (OUTPATIENT)
Age: 55
End: 2025-05-01

## 2025-05-01 NOTE — TELEPHONE ENCOUNTER
Lvm for patient x1 for reschedule 05/27/2025 appointment due to provider not being in the office, patient due for physical.

## 2025-05-27 DIAGNOSIS — E11.9 TYPE 2 DIABETES MELLITUS WITHOUT COMPLICATION, WITHOUT LONG-TERM CURRENT USE OF INSULIN (HCC): ICD-10-CM

## 2025-05-27 RX ORDER — TIRZEPATIDE 5 MG/.5ML
5 INJECTION, SOLUTION SUBCUTANEOUS WEEKLY
Qty: 2 ML | Refills: 0 | Status: SHIPPED | OUTPATIENT
Start: 2025-05-27

## 2025-05-27 NOTE — TELEPHONE ENCOUNTER
Reason for call:   [x] Refill   [] Prior Auth  [] Other:     Office:   [x] PCP/Provider - Zofia Maloney [facility] [place]   [] Specialty/Provider -     Medication: Mounjaro    Dose/Frequency: 5 mg    Quantity: 2 ml    Pharmacy: [address] [facility]   Does the patient have enough for 3 days?   [] Yes   [x] No - Send as HP to POD    Mail Away Pharmacy   Does the patient have enough for 10 days?   [] Yes   [] No - Send as HP to POD

## 2025-06-13 ENCOUNTER — VBI (OUTPATIENT)
Dept: ADMINISTRATIVE | Facility: OTHER | Age: 55
End: 2025-06-13

## 2025-06-13 NOTE — TELEPHONE ENCOUNTER
06/13/25 2:42 PM     Chart reviewed for Mammogram ; nothing is submitted to the patient's insurance at this time.     Beronica Jaimes MA   [facility]

## 2025-06-25 DIAGNOSIS — E11.9 TYPE 2 DIABETES MELLITUS WITHOUT COMPLICATION, WITHOUT LONG-TERM CURRENT USE OF INSULIN (HCC): ICD-10-CM

## 2025-06-25 RX ORDER — TIRZEPATIDE 5 MG/.5ML
5 INJECTION, SOLUTION SUBCUTANEOUS WEEKLY
Qty: 2 ML | Refills: 2 | Status: SHIPPED | OUTPATIENT
Start: 2025-06-25

## 2025-06-25 NOTE — TELEPHONE ENCOUNTER
Reason for call:   [x] Refill   [] Prior Auth  [x] Other:   patients injection is due today.    Office:   [x] PCP/Provider - Zofia Maloney MD   [] Specialty/Provider -     Medication: Tirzepatide (Mounjaro) 5 MG/0.5ML     Dose/Frequency: 5 mg, Subcutaneous, Weekly     Quantity: 2 ml    Pharmacy: [facility]/pharmacy #[id number] - [place] - [address] [facility]   Does the patient have enough for 3 days?   [] Yes   [x] No - Send as HP to POD    Mail Away Pharmacy   Does the patient have enough for 10 days?   [] Yes   [] No - Send as HP to POD

## 2025-07-09 ENCOUNTER — APPOINTMENT (OUTPATIENT)
Dept: LAB | Facility: MEDICAL CENTER | Age: 55
End: 2025-07-09
Payer: COMMERCIAL

## 2025-07-09 ENCOUNTER — OFFICE VISIT (OUTPATIENT)
Age: 55
End: 2025-07-09

## 2025-07-09 VITALS
HEART RATE: 80 BPM | TEMPERATURE: 97.5 F | OXYGEN SATURATION: 97 % | DIASTOLIC BLOOD PRESSURE: 80 MMHG | HEIGHT: 63 IN | BODY MASS INDEX: 48.36 KG/M2 | SYSTOLIC BLOOD PRESSURE: 130 MMHG

## 2025-07-09 DIAGNOSIS — Z12.12 ENCOUNTER FOR COLORECTAL CANCER SCREENING: ICD-10-CM

## 2025-07-09 DIAGNOSIS — E11.9 TYPE 2 DIABETES MELLITUS WITHOUT COMPLICATION, WITHOUT LONG-TERM CURRENT USE OF INSULIN (HCC): Primary | ICD-10-CM

## 2025-07-09 DIAGNOSIS — D50.9 IRON DEFICIENCY ANEMIA, UNSPECIFIED IRON DEFICIENCY ANEMIA TYPE: ICD-10-CM

## 2025-07-09 DIAGNOSIS — Z00.00 ANNUAL PHYSICAL EXAM: ICD-10-CM

## 2025-07-09 DIAGNOSIS — E11.9 TYPE 2 DIABETES MELLITUS WITHOUT COMPLICATION, WITHOUT LONG-TERM CURRENT USE OF INSULIN (HCC): ICD-10-CM

## 2025-07-09 DIAGNOSIS — Z12.11 ENCOUNTER FOR COLORECTAL CANCER SCREENING: ICD-10-CM

## 2025-07-09 DIAGNOSIS — I10 HTN (HYPERTENSION), BENIGN: ICD-10-CM

## 2025-07-09 DIAGNOSIS — Z12.31 ENCOUNTER FOR SCREENING MAMMOGRAM FOR MALIGNANT NEOPLASM OF BREAST: ICD-10-CM

## 2025-07-09 LAB
ALBUMIN SERPL BCG-MCNC: 4.1 G/DL (ref 3.5–5)
ALP SERPL-CCNC: 73 U/L (ref 34–104)
ALT SERPL W P-5'-P-CCNC: 12 U/L (ref 7–52)
ANION GAP SERPL CALCULATED.3IONS-SCNC: 8 MMOL/L (ref 4–13)
AST SERPL W P-5'-P-CCNC: 16 U/L (ref 13–39)
BASOPHILS # BLD AUTO: 0.05 THOUSANDS/ÂΜL (ref 0–0.1)
BASOPHILS NFR BLD AUTO: 1 % (ref 0–1)
BILIRUB SERPL-MCNC: 0.43 MG/DL (ref 0.2–1)
BILIRUB UR QL STRIP: NEGATIVE
BUN SERPL-MCNC: 17 MG/DL (ref 5–25)
CALCIUM SERPL-MCNC: 9.1 MG/DL (ref 8.4–10.2)
CHLORIDE SERPL-SCNC: 104 MMOL/L (ref 96–108)
CHOLEST SERPL-MCNC: 185 MG/DL (ref ?–200)
CLARITY UR: CLEAR
CO2 SERPL-SCNC: 26 MMOL/L (ref 21–32)
COLOR UR: COLORLESS
CREAT SERPL-MCNC: 1.33 MG/DL (ref 0.6–1.3)
EOSINOPHIL # BLD AUTO: 0.22 THOUSAND/ÂΜL (ref 0–0.61)
EOSINOPHIL NFR BLD AUTO: 3 % (ref 0–6)
ERYTHROCYTE [DISTWIDTH] IN BLOOD BY AUTOMATED COUNT: 15.3 % (ref 11.6–15.1)
EST. AVERAGE GLUCOSE BLD GHB EST-MCNC: 126 MG/DL
GFR SERPL CREATININE-BSD FRML MDRD: 44 ML/MIN/1.73SQ M
GLUCOSE P FAST SERPL-MCNC: 86 MG/DL (ref 65–99)
GLUCOSE UR STRIP-MCNC: NEGATIVE MG/DL
HBA1C MFR BLD: 6 %
HCT VFR BLD AUTO: 41.8 % (ref 34.8–46.1)
HDLC SERPL-MCNC: 51 MG/DL
HGB BLD-MCNC: 12.7 G/DL (ref 11.5–15.4)
HGB UR QL STRIP.AUTO: NEGATIVE
IMM GRANULOCYTES # BLD AUTO: 0.02 THOUSAND/UL (ref 0–0.2)
IMM GRANULOCYTES NFR BLD AUTO: 0 % (ref 0–2)
KETONES UR STRIP-MCNC: NEGATIVE MG/DL
LDLC SERPL CALC-MCNC: 115 MG/DL (ref 0–100)
LEUKOCYTE ESTERASE UR QL STRIP: NEGATIVE
LYMPHOCYTES # BLD AUTO: 2.79 THOUSANDS/ÂΜL (ref 0.6–4.47)
LYMPHOCYTES NFR BLD AUTO: 42 % (ref 14–44)
MCH RBC QN AUTO: 24.9 PG (ref 26.8–34.3)
MCHC RBC AUTO-ENTMCNC: 30.4 G/DL (ref 31.4–37.4)
MCV RBC AUTO: 82 FL (ref 82–98)
MONOCYTES # BLD AUTO: 0.39 THOUSAND/ÂΜL (ref 0.17–1.22)
MONOCYTES NFR BLD AUTO: 6 % (ref 4–12)
NEUTROPHILS # BLD AUTO: 3.19 THOUSANDS/ÂΜL (ref 1.85–7.62)
NEUTS SEG NFR BLD AUTO: 48 % (ref 43–75)
NITRITE UR QL STRIP: NEGATIVE
NRBC BLD AUTO-RTO: 0 /100 WBCS
PH UR STRIP.AUTO: 6 [PH]
PLATELET # BLD AUTO: 264 THOUSANDS/UL (ref 149–390)
PMV BLD AUTO: 10.2 FL (ref 8.9–12.7)
POTASSIUM SERPL-SCNC: 4.3 MMOL/L (ref 3.5–5.3)
PROT SERPL-MCNC: 7.6 G/DL (ref 6.4–8.4)
PROT UR STRIP-MCNC: NEGATIVE MG/DL
RBC # BLD AUTO: 5.1 MILLION/UL (ref 3.81–5.12)
SODIUM SERPL-SCNC: 138 MMOL/L (ref 135–147)
SP GR UR STRIP.AUTO: 1.01 (ref 1–1.03)
TRIGL SERPL-MCNC: 97 MG/DL (ref ?–150)
TSH SERPL DL<=0.05 MIU/L-ACNC: 1.52 UIU/ML (ref 0.45–4.5)
UROBILINOGEN UR STRIP-ACNC: <2 MG/DL
WBC # BLD AUTO: 6.66 THOUSAND/UL (ref 4.31–10.16)

## 2025-07-09 PROCEDURE — 80053 COMPREHEN METABOLIC PANEL: CPT

## 2025-07-09 PROCEDURE — 85025 COMPLETE CBC W/AUTO DIFF WBC: CPT

## 2025-07-09 PROCEDURE — 84443 ASSAY THYROID STIM HORMONE: CPT

## 2025-07-09 PROCEDURE — 80061 LIPID PANEL: CPT

## 2025-07-09 PROCEDURE — 36415 COLL VENOUS BLD VENIPUNCTURE: CPT

## 2025-07-09 PROCEDURE — 81003 URINALYSIS AUTO W/O SCOPE: CPT

## 2025-07-09 PROCEDURE — 83036 HEMOGLOBIN GLYCOSYLATED A1C: CPT

## 2025-07-09 RX ORDER — TIRZEPATIDE 7.5 MG/.5ML
7.5 INJECTION, SOLUTION SUBCUTANEOUS WEEKLY
Qty: 2 ML | Refills: 2 | Status: SHIPPED | OUTPATIENT
Start: 2025-07-09

## 2025-07-09 RX ORDER — TIRZEPATIDE 7.5 MG/.5ML
7.5 INJECTION, SOLUTION SUBCUTANEOUS WEEKLY
Qty: 2 ML | Refills: 0 | Status: SHIPPED | OUTPATIENT
Start: 2025-07-09 | End: 2025-07-09

## 2025-07-09 RX ORDER — ATOMOXETINE 25 MG/1
CAPSULE ORAL
COMMUNITY
Start: 2025-05-01 | End: 2025-07-10

## 2025-07-09 RX ORDER — IRON FUM,PS/FOLIC/BCOMP,C NO.9 125 MG-1MG
1 CAPSULE ORAL EVERY MORNING
Qty: 30 CAPSULE | Refills: 2 | Status: SHIPPED | OUTPATIENT
Start: 2025-07-09

## 2025-07-09 NOTE — PROGRESS NOTES
Adult Annual Physical  Name: Elizabeth Fuentes      : 1970      MRN: [id number]  Encounter Provider: Zofia Maloney MD  Encounter Date: 2025   Encounter department: [facility] [place]    :  Assessment & Plan  Encounter for screening mammogram for malignant neoplasm of breast         Encounter for colorectal cancer screening    Orders:  •  Ambulatory Referral to Gastroenterology; Future    Type 2 diabetes mellitus without complication, without long-term current use of insulin (HCC)    Lab Results   Component Value Date    HGBA1C 6.0 (H) 2025       Orders:  •  Hemoglobin A1C  •  Basic metabolic panel  •  Tirzepatide (Mounjaro) 7.5 MG/0.5ML SOAJ; Inject 7.5 mg under the skin once a week    Iron deficiency anemia, unspecified iron deficiency anemia type             Annual physical exam             Preventive Screenings:    - Cervical cancer screening: screening up-to-date     Immunizations:  - Immunizations due: Prevnar 20, Tdap and Zoster (Shingrix)         History of Present Illness     Adult Annual Physical:  Patient presents for annual physical.     Diet and Physical Activity:  - Diet/Nutrition: poor diet, adequate fiber intake and adequate whole grain intake. no junk food  - Exercise: moderate cardiovascular exercise, 5-7 times a week on average and less than 30 minutes on average.    Depression Screening:  - PHQ-2 Score: 0    General Health:  - Sleep: sleeps well and 7-8 hours of sleep on average.  - Hearing: normal hearing bilateral ears.  - Vision: most recent eye exam > 1 year ago. reading glasses  - Dental: regular dental visits, brushes teeth once daily and floss regularly.    /GYN Health:  - Follows with GYN: yes.   - History of STDs: no  - Contraception: menopause.            Medical History Reviewed by provider this encounter:     .See attached      Past Medical History   Past Medical History[1]  Past Surgical History[2]  Family History[3]   reports  "that she has never smoked. She has never used smokeless tobacco. She reports that she does not currently use alcohol after a past usage of about 1.0 standard drink of alcohol per week. She reports that she does not use drugs.  Current Outpatient Medications   Medication Instructions   • allopurinol (ZYLOPRIM) 100 mg, Daily PRN   • amLODIPine (NORVASC) 5 mg, Oral, Daily   • buPROPion (WELLBUTRIN XL) 300 mg, Every morning   • ergocalciferol (VITAMIN D2) 50,000 Units, Oral, Weekly   • VcQya-JnSjzo-EM-B Cmp-C-Biot (Iron Folate Plus) CAPS 1 capsule, Oral, Every morning   • Mounjaro 7.5 mg, Subcutaneous, Weekly   • rosuvastatin (CRESTOR) 20 mg, Oral, Daily   • Sodium Fluoride 5000 PPM 1.1 % PSTE Use as directed   Allergies[4]   Medications Ordered Prior to Encounter[5]   Social History[6]    Objective   /80 (BP Location: Left arm, Patient Position: Sitting, Cuff Size: Large)   Pulse 80   Temp 97.5 °F (36.4 °C) (Temporal)   Ht 5' 3\" (1.6 m)   LMP  (LMP Unknown)   SpO2 97%   BMI 48.36 kg/m²     Physical Exam           [1]  Past Medical History:  Diagnosis Date   • Anemia    • Anxiety    • Arthritis    • Hypertension 1/1/24   [2]  Past Surgical History:  Procedure Laterality Date   • NO PAST SURGERIES     [3]  Family History  Problem Relation Name Age of Onset   • Diabetes Mother Mom    • Hypertension Mother Mom    • Rheum arthritis Mother Mom    • Alcohol abuse Father Dad    • Diabetes Father Dad    • Anemia Sister     • Anxiety disorder Sister Sister         MGUS - age 55   [4]  Allergies  Allergen Reactions   • Peanut (Diagnostic) - Food Allergy Hives, Shortness Of Breath and Throat Swelling   • Cat Dander Allergic Rhinitis   • Shrimp (Diagnostic) - Food Allergy Swelling   [5]  Current Outpatient Medications on File Prior to Visit   Medication Sig Dispense Refill   • allopurinol (ZYLOPRIM) 100 mg tablet Take 100 mg by mouth daily as needed     • amLODIPine (NORVASC) 5 mg tablet Take 1 tablet (5 mg total) by " mouth daily 90 tablet 1   • buPROPion (WELLBUTRIN XL) 300 mg 24 hr tablet Take 300 mg by mouth every morning     • ergocalciferol (VITAMIN D2) 50,000 units Take 1 capsule (50,000 Units total) by mouth once a week 12 capsule 1   • Sodium Fluoride 5000 PPM 1.1 % PSTE Use as directed     • [DISCONTINUED] atoMOXetine (STRATTERA) 25 mg capsule for 30 days (Patient not taking: Reported on 7/9/2025)     • [DISCONTINUED] busPIRone (BUSPAR) 10 mg tablet TAKE 1 TABLET BY MOUTH EVERY 24 HOURS FOR 30 DAYS (Patient not taking: Reported on 7/9/2025)       No current facility-administered medications on file prior to visit.   [6]  Social History  Tobacco Use   • Smoking status: Never   • Smokeless tobacco: Never   Vaping Use   • Vaping status: Never Used   Substance and Sexual Activity   • Alcohol use: Not Currently     Alcohol/week: 1.0 standard drink of alcohol     Types: 1 Glasses of wine per week     Comment: Every 3 weeks   • Drug use: Never   • Sexual activity: Yes     Partners: Male     Birth control/protection: Post-menopausal

## 2025-07-09 NOTE — ASSESSMENT & PLAN NOTE
Lab Results   Component Value Date    HGBA1C 6.0 (H) 07/09/2025       Orders:  •  Hemoglobin A1C  •  Basic metabolic panel  •  Tirzepatide (Mounjaro) 7.5 MG/0.5ML SOAJ; Inject 7.5 mg under the skin once a week

## 2025-07-09 NOTE — PROGRESS NOTES
Adult Annual Physical  Name: Elizabeth Fuentes      : 1970      MRN: [id number]  Encounter Provider: Zofia Maloney MD  Encounter Date: 2025   Encounter department: [facility] C[facility] [place]    :  Assessment & Plan  Encounter for screening mammogram for malignant neoplasm of breast         Encounter for colorectal cancer screening    Orders:  •  Ambulatory Referral to Gastroenterology; Future    Type 2 diabetes mellitus without complication, without long-term current use of insulin (HCC)    Lab Results   Component Value Date    HGBA1C 6.0 (H) 2025       Orders:  •  Hemoglobin A1C  •  Basic metabolic panel  •  Tirzepatide (Mounjaro) 7.5 MG/0.5ML SOAJ; Inject 7.5 mg under the skin once a week    Iron deficiency anemia, unspecified iron deficiency anemia type           Annual physical exam           Assessment & Plan  1. Annual physical examination.  - PHQ score is zero, indicating no signs of depression. General health is satisfactory, with good sleep patterns and no hearing or vision issues. Uses reading glasses and maintains regular dental check-ups. Follows up with gynecologist, has not menstruated for two years, and does not require contraception. No history of sexually transmitted diseases. Power of  in place and aware of advanced care planning documents.  - Recent labs suggest slight iron deficiency, consistent with known history. Thyroid function is normal. Urine test shows no signs of infection, protein, glucose, or blood. A1c improved from 6.7 to 6, and blood sugar decreased from 148 to 126. Lipid profile within normal limits.  - Advised to continue current regimen of Mounjaro 5 mg for another six weeks, after which dose can be increased if necessary. Recommended to take over-the-counter supplements containing iron, B12, and folic acid once daily.  - Referral to gastroenterologist will be made. Encouraged to undergo a mammogram.      Preventive  Screenings:  - Diabetes Screening: has diabetes and orders placed  - Cholesterol Screening: has hyperlipidemia and orders placed   - Hepatitis C screening: patient declines   - HIV screening: patient declines   - Cervical cancer screening: screening up-to-date   - Breast cancer screening: risks/benefits discussed   - Colon cancer screening: risks/benefits discussed   - Lung cancer screening: screening not indicated     Immunizations:  - Immunizations due: Prevnar 20, Tdap and Zoster (Shingrix)    Counseling/Anticipatory Guidance:    - Dental health: discussed importance of regular tooth brushing, flossing, and dental visits.   - Sexual health: discussed sexually transmitted diseases, partner selection, use of condoms, avoidance of unintended pregnancy, and contraceptive alternatives.   - Diet: discussed recommendations for a healthy/well-balanced diet.   - Exercise: the importance of regular exercise/physical activity was discussed. Recommend exercise 3-5 times per week for at least 30 minutes.   - Injury prevention: discussed safety/seat belts, safety helmets, smoke detectors, carbon monoxide detectors, and smoking near bedding or upholstery.       Depression Screening and Follow-up Plan: Patient was screened for depression during today's encounter. They screened negative with a PHQ-2 score of 0.        Chief Complaint   Patient presents with   • Physical Exam     Annual physical   • Follow-up     Labs done today   • Health Screening     Mammogram order in chart colonoscopy ordered Diabetic foot      History of Present Illness     Adult Annual Physical:  Patient presents for annual physical.     Diet and Physical Activity:  - Diet/Nutrition: well balanced diet, portion control, heart healthy (low sodium) diet and adequate fiber intake.  - Exercise: moderate cardiovascular exercise.    Depression Screening:  - PHQ-2 Score: 0    General Health:  - Sleep: sleeps well.  - Hearing: normal hearing bilateral ears.  -  Vision: wears glasses.  - Dental: regular dental visits.    /GYN Health:  - Follows with GYN: yes.   - Menopause: postmenopausal.   - History of STDs: no    Advanced Care Planning:  - Has an advanced directive?: yes    - Has a durable medical POA?: yes      History of Present Illness  Chief Complaint   Patient presents with   • Physical Exam     Annual physical   • Follow-up     Labs done today   • Health Screening     Mammogram order in chart colonoscopy ordered Diabetic foot          The patient presents for an annual physical exam and follow up.    She reports a significant weight loss of 19 pounds, achieved through portion control and a heart-healthy, low-salt diet rich in fiber. Her exercise routine includes aerobics and strength training with weights, performed five days a week. She maintains good sleep hygiene and reports no issues with hearing or vision, although she has not had an eye exam this year. She uses reading glasses and keeps up with regular dental check-ups.     She is under the care of a gynecologist and has not menstruated for two years, thus does not require contraception. She reports no history of sexually transmitted diseases. She has a power of  in place and is aware of her advanced care planning documents.    She consumes meat and has a known history of iron deficiency. She is currently on bupropion and Mounjaro, the latter of which she finds effective. She has been on a 5 mg dose of Mounjaro for six weeks and is considering increasing the dosage. She administers her injection at 6 AM, eats breakfast before 8 AM, skips lunch due to lack of hunger, and has dinner at 5:30 PM. She takes a nap between 3 PM and 4 PM daily.    She is due for a mammogram but declines an HIV screen at this time. She mentions that some of her tests are not covered by her insurance, resulting in out-of-pocket expenses.    FAMILY HISTORY  The patient mentions that her family has a history of irregularly  "shaped blood cells, more oval than round, which is probably due to their low iron levels.  Review of Systems    Review of Systems - all else is negative        Medical History Reviewed by provider this encounter:     .  Past Medical History   Past Medical History[1]  Past Surgical History[2]  Family History[3]   reports that she has never smoked. She has never used smokeless tobacco. She reports that she does not currently use alcohol after a past usage of about 1.0 standard drink of alcohol per week. She reports that she does not use drugs.  Current Outpatient Medications   Medication Instructions   • allopurinol (ZYLOPRIM) 100 mg, Daily PRN   • amLODIPine (NORVASC) 5 mg, Oral, Daily   • buPROPion (WELLBUTRIN XL) 300 mg, Every morning   • ergocalciferol (VITAMIN D2) 50,000 Units, Oral, Weekly   • SlDnr-YqCrni-NP-B Cmp-C-Biot (Iron Folate Plus) CAPS 1 capsule, Oral, Every morning   • Mounjaro 7.5 mg, Subcutaneous, Weekly   • rosuvastatin (CRESTOR) 20 mg, Oral, Daily   • Sodium Fluoride 5000 PPM 1.1 % PSTE Use as directed   Allergies[4]   Medications Ordered Prior to Encounter[5]   Social History[6]    Objective   /80 (BP Location: Left arm, Patient Position: Sitting, Cuff Size: Large)   Pulse 80   Temp 97.5 °F (36.4 °C) (Temporal)   Ht 5' 3\" (1.6 m)   LMP  (LMP Unknown)   SpO2 97%   BMI 48.36 kg/m²     Physical Exam  Gen: NAD, BMI is elevated. Patient declined weight  Heent: atraumatic, normocephalic  Mouth: is moist  Neck: is supple, no JVD, no carotid bruits.   Heart: is regular Normal s1, s2,  Lungs: are clear to auscultation  Abd: soft, non tender, NABS  Ext: no edema, distal pulses normal  Skin: no rashes, ulcers  Mood: normal affect  Neuro: AAOx3              [1]  Past Medical History:  Diagnosis Date   • Anemia    • Anxiety    • Arthritis    • Hypertension 1/1/24   [2]  Past Surgical History:  Procedure Laterality Date   • NO PAST SURGERIES     [3]  Family History  Problem Relation Name Age of " What Is The Reason For Today's Visit?: Excessive sun exposure Onset   • Diabetes Mother Mom    • Hypertension Mother Mom    • Rheum arthritis Mother Mom    • Alcohol abuse Father Dad    • Diabetes Father Dad    • Anemia Sister     • Anxiety disorder Sister Sister         MGUS - age 55   [4]  Allergies  Allergen Reactions   • Peanut (Diagnostic) - Food Allergy Hives, Shortness Of Breath and Throat Swelling   • Cat Dander Allergic Rhinitis   • Shrimp (Diagnostic) - Food Allergy Swelling   [5]  Current Outpatient Medications on File Prior to Visit   Medication Sig Dispense Refill   • allopurinol (ZYLOPRIM) 100 mg tablet Take 100 mg by mouth daily as needed     • amLODIPine (NORVASC) 5 mg tablet Take 1 tablet (5 mg total) by mouth daily 90 tablet 1   • buPROPion (WELLBUTRIN XL) 300 mg 24 hr tablet Take 300 mg by mouth every morning     • ergocalciferol (VITAMIN D2) 50,000 units Take 1 capsule (50,000 Units total) by mouth once a week 12 capsule 1   • Sodium Fluoride 5000 PPM 1.1 % PSTE Use as directed     • [DISCONTINUED] atoMOXetine (STRATTERA) 25 mg capsule for 30 days (Patient not taking: Reported on 7/9/2025)     • [DISCONTINUED] busPIRone (BUSPAR) 10 mg tablet TAKE 1 TABLET BY MOUTH EVERY 24 HOURS FOR 30 DAYS (Patient not taking: Reported on 7/9/2025)       No current facility-administered medications on file prior to visit.   [6]  Social History  Tobacco Use   • Smoking status: Never   • Smokeless tobacco: Never   Vaping Use   • Vaping status: Never Used   Substance and Sexual Activity   • Alcohol use: Not Currently     Alcohol/week: 1.0 standard drink of alcohol     Types: 1 Glasses of wine per week     Comment: Every 3 weeks   • Drug use: Never   • Sexual activity: Yes     Partners: Male     Birth control/protection: Post-menopausal

## 2025-07-10 NOTE — PATIENT INSTRUCTIONS
"Patient Education     Routine physical for adults   The Basics   Written by the doctors and editors at [facility]   What is a physical? -- A physical is a routine visit, or \"check-up,\" with your doctor. You might also hear it called a \"wellness visit\" or \"preventive visit.\"  During each visit, the doctor will:   Ask about your physical and mental health   Ask about your habits, behaviors, and lifestyle   Do an exam   Give you vaccines if needed   Talk to you about any medicines you take   Give advice about your health   Answer your questions  Getting regular check-ups is an important part of taking care of your health. It can help your doctor find and treat any problems you have. But it's also important for preventing health problems.  A routine physical is different from a \"sick visit.\" A sick visit is when you see a doctor because of a health concern or problem. Since physicals are scheduled ahead of time, you can think about what you want to ask the doctor.  How often should I get a physical? -- It depends on your age and health. In general, for people age 21 years and older:   If you are younger than 50 years, you might be able to get a physical every 3 years.   If you are 50 years or older, your doctor might recommend a physical every year.  If you have an ongoing health condition, like diabetes or high blood pressure, your doctor will probably want to see you more often.  What happens during a physical? -- In general, each visit will include:   Physical exam - The doctor or nurse will check your height, weight, heart rate, and blood pressure. They will also look at your eyes and ears. They will ask about how you are feeling and whether you have any symptoms that bother you.   Medicines - It's a good idea to bring a list of all the medicines you take to each doctor visit. Your doctor will talk to you about your medicines and answer any questions. Tell them if you are having any side effects that bother you. You " "should also tell them if you are having trouble paying for any of your medicines.   Habits and behaviors - This includes:   Your diet   Your exercise habits   Whether you smoke, drink alcohol, or use drugs   Whether you are sexually active   Whether you feel safe at home  Your doctor will talk to you about things you can do to improve your health and lower your risk of health problems. They will also offer help and support. For example, if you want to quit smoking, they can give you advice and might prescribe medicines. If you want to improve your diet or get more physical activity, they can help you with this, too.   Lab tests, if needed - The tests you get will depend on your age and situation. For example, your doctor might want to check your:   Cholesterol   Blood sugar   Iron level   Vaccines - The recommended vaccines will depend on your age, health, and what vaccines you already had. Vaccines are very important because they can prevent certain serious or deadly infections.   Discussion of screening - \"Screening\" means checking for diseases or other health problems before they cause symptoms. Your doctor can recommend screening based on your age, risk, and preferences. This might include tests to check for:   Cancer, such as breast, prostate, cervical, ovarian, colorectal, prostate, lung, or skin cancer   Sexually transmitted infections, such as chlamydia and gonorrhea   Mental health conditions like depression and anxiety  Your doctor will talk to you about the different types of screening tests. They can help you decide which screenings to have. They can also explain what the results might mean.   Answering questions - The physical is a good time to ask the doctor or nurse questions about your health. If needed, they can refer you to other doctors or specialists, too.  Adults older than 65 years often need other care, too. As you get older, your doctor will talk to you about:   How to prevent falling at " home   Hearing or vision tests   Memory testing   How to take your medicines safely   Making sure that you have the help and support you need at home  All topics are updated as new evidence becomes available and our peer review process is complete.  This topic retrieved from [employer] on: May 02, 2024.  Topic 477942 Version 1.0  Release: 32.4.3 - C32.122  © 2024 UpToDate, Inc. and/or its affiliates. All rights reserved.  Consumer Information Use and Disclaimer   Disclaimer: This generalized information is a limited summary of diagnosis, treatment, and/or medication information. It is not meant to be comprehensive and should be used as a tool to help the user understand and/or assess potential diagnostic and treatment options. It does NOT include all information about conditions, treatments, medications, side effects, or risks that may apply to a specific patient. It is not intended to be medical advice or a substitute for the medical advice, diagnosis, or treatment of a health care provider based on the health care provider's examination and assessment of a patient's specific and unique circumstances. Patients must speak with a health care provider for complete information about their health, medical questions, and treatment options, including any risks or benefits regarding use of medications. This information does not endorse any treatments or medications as safe, effective, or approved for treating a specific patient. UpToDate, Inc. and its affiliates disclaim any warranty or liability relating to this information or the use thereof.The use of this information is governed by the Terms of Use, available at https://www.woltersKoviouwer.com/en/know/clinical-effectiveness-terms. 2024© UpToDate, Inc. and its affiliates and/or licensors. All rights reserved.  Copyright   © 2024 UpToDate, Inc. and/or its affiliates. All rights reserved.

## 2025-07-23 DIAGNOSIS — E11.9 TYPE 2 DIABETES MELLITUS WITHOUT COMPLICATION, WITHOUT LONG-TERM CURRENT USE OF INSULIN (HCC): ICD-10-CM

## 2025-07-23 RX ORDER — TIRZEPATIDE 7.5 MG/.5ML
7.5 INJECTION, SOLUTION SUBCUTANEOUS WEEKLY
Qty: 2 ML | Refills: 2 | Status: SHIPPED | OUTPATIENT
Start: 2025-07-23

## 2025-07-23 NOTE — TELEPHONE ENCOUNTER
Patient called requesting refill for Mounjaro. Patient made aware medication was refilled on 07/09/25 for 2mL with 2 refills to [facility] pharmacy. Patient instructed to contact the pharmacy and speak with someone directly to obtain refills of medication. Patient advised to call back for refill if their pharmacy is unable to assist them. Patient verbalized understanding.